# Patient Record
Sex: MALE | Race: WHITE | NOT HISPANIC OR LATINO | Employment: UNEMPLOYED | URBAN - METROPOLITAN AREA
[De-identification: names, ages, dates, MRNs, and addresses within clinical notes are randomized per-mention and may not be internally consistent; named-entity substitution may affect disease eponyms.]

---

## 2024-05-23 ENCOUNTER — OFFICE VISIT (OUTPATIENT)
Dept: FAMILY MEDICINE CLINIC | Facility: CLINIC | Age: 34
End: 2024-05-23
Payer: COMMERCIAL

## 2024-05-23 VITALS
TEMPERATURE: 97.3 F | BODY MASS INDEX: 32.07 KG/M2 | SYSTOLIC BLOOD PRESSURE: 140 MMHG | HEIGHT: 73 IN | DIASTOLIC BLOOD PRESSURE: 80 MMHG | RESPIRATION RATE: 20 BRPM | WEIGHT: 242 LBS | HEART RATE: 68 BPM

## 2024-05-23 DIAGNOSIS — K21.9 GASTROESOPHAGEAL REFLUX DISEASE WITHOUT ESOPHAGITIS: ICD-10-CM

## 2024-05-23 DIAGNOSIS — Z00.00 ANNUAL PHYSICAL EXAM: Primary | ICD-10-CM

## 2024-05-23 DIAGNOSIS — Z13.220 SCREENING CHOLESTEROL LEVEL: ICD-10-CM

## 2024-05-23 PROBLEM — L40.50 PSORIATIC ARTHRITIS: Status: ACTIVE | Noted: 2024-05-23

## 2024-05-23 PROBLEM — M05.742 RHEUMATOID ARTHRITIS INVOLVING BOTH HANDS WITH POSITIVE RHEUMATOID FACTOR: Status: ACTIVE | Noted: 2024-05-23

## 2024-05-23 PROBLEM — K22.70 BARRETT'S ESOPHAGUS WITHOUT DYSPLASIA: Status: ACTIVE | Noted: 2024-05-23

## 2024-05-23 PROBLEM — M05.741 RHEUMATOID ARTHRITIS INVOLVING BOTH HANDS WITH POSITIVE RHEUMATOID FACTOR: Status: ACTIVE | Noted: 2024-05-23

## 2024-05-23 PROCEDURE — 2014F MENTAL STATUS ASSESS: CPT | Performed by: FAMILY MEDICINE

## 2024-05-23 PROCEDURE — 99385 PREV VISIT NEW AGE 18-39: CPT | Performed by: FAMILY MEDICINE

## 2024-05-23 RX ORDER — OMEPRAZOLE 20 MG/1
20 CAPSULE, DELAYED RELEASE ORAL DAILY
Qty: 90 CAPSULE | Refills: 3 | Status: SHIPPED | OUTPATIENT
Start: 2024-05-23

## 2024-05-23 RX ORDER — OMEPRAZOLE 20 MG/1
1 CAPSULE, DELAYED RELEASE ORAL DAILY
COMMUNITY
Start: 2024-04-15 | End: 2024-05-23 | Stop reason: SDUPTHER

## 2024-05-23 NOTE — PROGRESS NOTES
"Chief Complaint   Patient presents with    NP / establish PCP     Heartburn     Acid reflux / indigestion (seen at [facility] approx 4 wks ago)        Subjective      Joel Angelo is a 34 y.o. who presents for Annual Physical.    Sleep.  6 to 8 hours per night.    Diet.  Patient eats whole foods with minimal junk food.  He has lactose intolerance.    Physical activity.  Patient exercises combination of aerobic and resistance training 6 days/week.    Medical history is significant for Baires's esophagus diagnosed in 2020 with biopsies negative for dysplasia.  Patient is also been diagnosed with rheumatoid arthritis and psoriatic arthritis.  He did not tolerate methotrexate.    The following portions of the patient's history were reviewed and updated as appropriate: allergies, current medications, past family history, past medical history, past social history, past surgical history, and problem list.    Review of Systems   Gastrointestinal:  Positive for GERD.   Musculoskeletal:  Positive for arthralgias.   All other systems reviewed and are negative.      Objective   Vital Signs:  /80   Pulse 68   Temp 97.3 °F (36.3 °C)   Resp 20   Ht 185.4 cm (73\")   Wt 110 kg (242 lb)   BMI 31.93 kg/m²     BMI is >= 30 and <35. (Class 1 Obesity). The following options were offered after discussion;: exercise counseling/recommendations and nutrition counseling/recommendations        Physical Exam  Constitutional:       General: He is not in acute distress.     Appearance: Normal appearance. He is not ill-appearing.   HENT:      Head: Normocephalic and atraumatic.      Right Ear: Tympanic membrane and ear canal normal.      Left Ear: Tympanic membrane and ear canal normal.      Nose: Nose normal.      Mouth/Throat:      Mouth: Mucous membranes are moist.      Pharynx: Oropharynx is clear. No posterior oropharyngeal erythema.   Eyes:      Extraocular Movements: Extraocular movements intact.      Conjunctiva/sclera: Conjunctivae " normal.      Pupils: Pupils are equal, round, and reactive to light.   Cardiovascular:      Rate and Rhythm: Normal rate and regular rhythm.      Pulses: Normal pulses.      Heart sounds: Normal heart sounds.   Pulmonary:      Effort: Pulmonary effort is normal. No respiratory distress.      Breath sounds: Normal breath sounds.   Abdominal:      General: Abdomen is flat. Bowel sounds are normal.      Palpations: Abdomen is soft.      Tenderness: There is no abdominal tenderness.   Musculoskeletal:         General: Normal range of motion.      Cervical back: Normal range of motion and neck supple.   Lymphadenopathy:      Cervical: No cervical adenopathy.   Skin:     General: Skin is warm and dry.      Capillary Refill: Capillary refill takes less than 2 seconds.   Neurological:      General: No focal deficit present.      Mental Status: He is alert and oriented to person, place, and time. Mental status is at baseline.      Cranial Nerves: No cranial nerve deficit.      Sensory: No sensory deficit.      Motor: No weakness.   Psychiatric:         Mood and Affect: Mood normal.         Behavior: Behavior normal.         Thought Content: Thought content normal.         Judgment: Judgment normal.          Result Review                     Assessment and Plan  Diagnoses and all orders for this visit:    1. Annual physical exam (Primary)    2. Gastroesophageal reflux disease without esophagitis  -     omeprazole (priLOSEC) 20 MG capsule; Take 1 capsule by mouth Daily.  Dispense: 90 capsule; Refill: 3    3. Screening cholesterol level  -     Lipid Panel; Future    Plan  1.  Patient should continue his active lifestyle.  While his BMI is above goal patient is a well muscled individual.  We reviewed physical activity and dietary guidelines  2.  Patient will return next week for screening labs  3.  Tdap is up-to-date and received prior to transferring care to this office  4.  Omeprazole was refilled for 1 year and patient will  need EGD in 2025  5.  He will continue over-the-counter NSAIDs for any joint pains which occur infrequently        Follow Up  Return in about 1 year (around 5/23/2025) for Annual.  Patient was given instructions and counseling regarding his condition or for health maintenance advice. Please see specific information pulled into the AVS if appropriate.

## 2024-05-30 ENCOUNTER — PATIENT ROUNDING (BHMG ONLY) (OUTPATIENT)
Dept: FAMILY MEDICINE CLINIC | Facility: CLINIC | Age: 34
End: 2024-05-30
Payer: COMMERCIAL

## 2024-06-14 ENCOUNTER — OFFICE VISIT (OUTPATIENT)
Dept: FAMILY MEDICINE CLINIC | Facility: CLINIC | Age: 34
End: 2024-06-14
Payer: COMMERCIAL

## 2024-06-14 VITALS
SYSTOLIC BLOOD PRESSURE: 116 MMHG | TEMPERATURE: 98.4 F | OXYGEN SATURATION: 95 % | HEIGHT: 73 IN | WEIGHT: 241 LBS | HEART RATE: 66 BPM | DIASTOLIC BLOOD PRESSURE: 72 MMHG | BODY MASS INDEX: 31.94 KG/M2

## 2024-06-14 DIAGNOSIS — R31.9 HEMATURIA, UNSPECIFIED TYPE: Primary | ICD-10-CM

## 2024-06-14 LAB
BILIRUB BLD-MCNC: NEGATIVE MG/DL
CLARITY, POC: CLEAR
COLOR UR: YELLOW
EXPIRATION DATE: NORMAL
GLUCOSE UR STRIP-MCNC: NEGATIVE MG/DL
KETONES UR QL: NEGATIVE
LEUKOCYTE EST, POC: NEGATIVE
Lab: NORMAL
NITRITE UR-MCNC: NEGATIVE MG/ML
PH UR: 7.5 [PH] (ref 5–8)
PROT UR STRIP-MCNC: NEGATIVE MG/DL
RBC # UR STRIP: NEGATIVE /UL
SP GR UR: 1.01 (ref 1–1.03)
UROBILINOGEN UR QL: NORMAL

## 2024-06-14 NOTE — PROGRESS NOTES
Date: 2024   Patient Name: Joel Angelo  : 1990   MRN: [id number]     Chief Complaint:    Chief Complaint   Patient presents with    Blood in Urine     Noticed Tuesday, but not noticed since       History of Present Illness: Joel Angelo is a 34 y.o. male who is here today for On Tuesday, patient not painless hematuria. He also noticed blood in his underwear. He denies any suprapubic pain, flank pain, dysuria, urinary frequency. He has not had any hematuria since Tuesday. He has not had any new sex partners.     Blood in Urine             Review of Systems:   Review of Systems   Genitourinary:  Positive for hematuria.       Past Medical History:   Past Medical History:   Diagnosis Date    Baires's esophagus        Past Surgical History: History reviewed. No pertinent surgical history.    Family History:   Family History   Problem Relation Age of Onset    Diabetes type II Father     Rheum arthritis Father     Diabetes type II Sister        Social History:   Social History     Socioeconomic History    Marital status:    Tobacco Use    Smoking status: Former     Current packs/day: 0.00     Average packs/day: 0.3 packs/day for 2.9 years (0.7 ttl pk-yrs)     Types: Cigarettes     Start date: 2010     Quit date: 2012     Years since quittin.5    Smokeless tobacco: Former   Vaping Use    Vaping status: Never Used   Substance and Sexual Activity    Alcohol use: Not Currently     Alcohol/week: 1.0 standard drink of alcohol     Types: 1 Drinks containing 0.5 oz of alcohol per week    Drug use: Never    Sexual activity: Yes     Partners: Female     Birth control/protection: Hysterectomy       Medications:     Current Outpatient Medications:     omeprazole (priLOSEC) 20 MG capsule, Take 1 capsule by mouth Daily., Disp: 90 capsule, Rfl: 3    Allergies:   Allergies   Allergen Reactions    Methotrexate Derivatives Other (See Comments)     Patient felt unwell         Physical Exam:  Vital  "Signs:   Vitals:    06/14/24 1243   BP: 116/72   Pulse: 66   Temp: 98.4 °F (36.9 °C)   SpO2: 95%   Weight: 109 kg (241 lb)   Height: 185.4 cm (73\")     Body mass index is 31.8 kg/m².     Physical Exam  Vitals and nursing note reviewed.   Constitutional:       Appearance: Normal appearance.   HENT:      Head: Normocephalic and atraumatic.   Cardiovascular:      Rate and Rhythm: Normal rate and regular rhythm.   Pulmonary:      Effort: Pulmonary effort is normal. No respiratory distress.      Breath sounds: Normal breath sounds.   Abdominal:      General: Bowel sounds are normal.      Palpations: Abdomen is soft.      Tenderness: There is no abdominal tenderness. There is no right CVA tenderness or left CVA tenderness.   Musculoskeletal:         General: Normal range of motion.   Skin:     General: Skin is warm.   Neurological:      General: No focal deficit present.      Mental Status: He is alert and oriented to person, place, and time.   Psychiatric:         Mood and Affect: Mood normal.           Assessment/Plan:   Diagnoses and all orders for this visit:    1. Hematuria, unspecified type (Primary)  -     POCT urinalysis dipstick, automated       UA negative in clinic  Educated patient potentially passed a kidney stone  Symptoms have since resolved  If symptoms present again, encouraged to get into clinic sooner  If symptoms are severe 10/10, blood in urine go to the ER     Follow Up:   Return if symptoms worsen or fail to improve.    Stacey Cadena. SHANDRA   [facility]   "

## 2024-09-08 ENCOUNTER — ANESTHESIA EVENT (OUTPATIENT)
Dept: PERIOP | Facility: HOSPITAL | Age: 34
End: 2024-09-08
Payer: COMMERCIAL

## 2024-09-08 ENCOUNTER — ANESTHESIA (OUTPATIENT)
Dept: PERIOP | Facility: HOSPITAL | Age: 34
End: 2024-09-08
Payer: COMMERCIAL

## 2024-09-08 ENCOUNTER — HOSPITAL ENCOUNTER (OUTPATIENT)
Facility: HOSPITAL | Age: 34
Discharge: HOME | End: 2024-09-09
Attending: EMERGENCY MEDICINE | Admitting: SURGERY
Payer: COMMERCIAL

## 2024-09-08 ENCOUNTER — APPOINTMENT (OUTPATIENT)
Facility: HOSPITAL | Age: 34
End: 2024-09-08
Payer: COMMERCIAL

## 2024-09-08 DIAGNOSIS — K37 APPENDICITIS: ICD-10-CM

## 2024-09-08 DIAGNOSIS — K35.30 ACUTE APPENDICITIS WITH LOCALIZED PERITONITIS, WITHOUT PERFORATION, ABSCESS, OR GANGRENE: ICD-10-CM

## 2024-09-08 DIAGNOSIS — K35.30 ACUTE APPENDICITIS WITH LOCALIZED PERITONITIS, WITHOUT PERFORATION OR ABSCESS, UNSPECIFIED WHETHER GANGRENE PRESENT: Primary | ICD-10-CM

## 2024-09-08 LAB
ALBUMIN SERPL-MCNC: 4.8 G/DL (ref 3.5–5.2)
ALBUMIN/GLOB SERPL: 1.8 G/DL
ALP SERPL-CCNC: 142 U/L (ref 39–117)
ALT SERPL W P-5'-P-CCNC: 29 U/L (ref 1–41)
ANION GAP SERPL CALCULATED.3IONS-SCNC: 11.5 MMOL/L (ref 5–15)
AST SERPL-CCNC: 25 U/L (ref 1–40)
BASOPHILS # BLD AUTO: 0.03 10*3/MM3 (ref 0–0.2)
BASOPHILS NFR BLD AUTO: 0.5 % (ref 0–1.5)
BILIRUB SERPL-MCNC: 0.5 MG/DL (ref 0–1.2)
BILIRUB UR QL STRIP: NEGATIVE
BUN SERPL-MCNC: 14 MG/DL (ref 6–20)
BUN/CREAT SERPL: 15.1 (ref 7–25)
CALCIUM SPEC-SCNC: 9.9 MG/DL (ref 8.6–10.5)
CHLORIDE SERPL-SCNC: 103 MMOL/L (ref 98–107)
CLARITY UR: CLEAR
CO2 SERPL-SCNC: 26.5 MMOL/L (ref 22–29)
COLOR UR: YELLOW
CREAT SERPL-MCNC: 0.93 MG/DL (ref 0.76–1.27)
DEPRECATED RDW RBC AUTO: 41.5 FL (ref 37–54)
EGFRCR SERPLBLD CKD-EPI 2021: 110.5 ML/MIN/1.73
EOSINOPHIL # BLD AUTO: 0.13 10*3/MM3 (ref 0–0.4)
EOSINOPHIL NFR BLD AUTO: 2.1 % (ref 0.3–6.2)
ERYTHROCYTE [DISTWIDTH] IN BLOOD BY AUTOMATED COUNT: 12.8 % (ref 12.3–15.4)
GLOBULIN UR ELPH-MCNC: 2.7 GM/DL
GLUCOSE SERPL-MCNC: 100 MG/DL (ref 65–99)
GLUCOSE UR STRIP-MCNC: NEGATIVE MG/DL
HCT VFR BLD AUTO: 46.9 % (ref 37.5–51)
HGB BLD-MCNC: 15.9 G/DL (ref 13–17.7)
HGB UR QL STRIP.AUTO: NEGATIVE
HOLD SPECIMEN: NORMAL
IMM GRANULOCYTES # BLD AUTO: 0 10*3/MM3 (ref 0–0.05)
IMM GRANULOCYTES NFR BLD AUTO: 0 % (ref 0–0.5)
KETONES UR QL STRIP: NEGATIVE
LEUKOCYTE ESTERASE UR QL STRIP.AUTO: NEGATIVE
LIPASE SERPL-CCNC: 29 U/L (ref 13–60)
LYMPHOCYTES # BLD AUTO: 1.83 10*3/MM3 (ref 0.7–3.1)
LYMPHOCYTES NFR BLD AUTO: 30.2 % (ref 19.6–45.3)
MCH RBC QN AUTO: 29.8 PG (ref 26.6–33)
MCHC RBC AUTO-ENTMCNC: 33.9 G/DL (ref 31.5–35.7)
MCV RBC AUTO: 87.8 FL (ref 79–97)
MONOCYTES # BLD AUTO: 0.61 10*3/MM3 (ref 0.1–0.9)
MONOCYTES NFR BLD AUTO: 10.1 % (ref 5–12)
NEUTROPHILS NFR BLD AUTO: 3.46 10*3/MM3 (ref 1.7–7)
NEUTROPHILS NFR BLD AUTO: 57.1 % (ref 42.7–76)
NITRITE UR QL STRIP: NEGATIVE
PH UR STRIP.AUTO: 7 [PH] (ref 5–8)
PLATELET # BLD AUTO: 243 10*3/MM3 (ref 140–450)
PMV BLD AUTO: 10 FL (ref 6–12)
POTASSIUM SERPL-SCNC: 4.5 MMOL/L (ref 3.5–5.2)
PROT SERPL-MCNC: 7.5 G/DL (ref 6–8.5)
PROT UR QL STRIP: NEGATIVE
RBC # BLD AUTO: 5.34 10*6/MM3 (ref 4.14–5.8)
SODIUM SERPL-SCNC: 141 MMOL/L (ref 136–145)
SP GR UR STRIP: <=1.005 (ref 1–1.03)
UROBILINOGEN UR QL STRIP: NORMAL
WBC NRBC COR # BLD AUTO: 6.06 10*3/MM3 (ref 3.4–10.8)
WHOLE BLOOD HOLD COAG: NORMAL
WHOLE BLOOD HOLD SPECIMEN: NORMAL

## 2024-09-08 PROCEDURE — 25810000003 SODIUM CHLORIDE 0.9 % SOLUTION: Performed by: PHYSICIAN ASSISTANT

## 2024-09-08 PROCEDURE — 25510000001 IOPAMIDOL 61 % SOLUTION: Performed by: EMERGENCY MEDICINE

## 2024-09-08 PROCEDURE — 25010000002 BUPIVACAINE 0.5 % SOLUTION: Performed by: SURGERY

## 2024-09-08 PROCEDURE — 96365 THER/PROPH/DIAG IV INF INIT: CPT

## 2024-09-08 PROCEDURE — 25010000002 PIPERACILLIN SOD-TAZOBACTAM PER 1 G: Performed by: PHYSICIAN ASSISTANT

## 2024-09-08 PROCEDURE — 25010000002 DROPERIDOL PER 5 MG

## 2024-09-08 PROCEDURE — 25010000002 KETOROLAC TROMETHAMINE PER 15 MG: Performed by: STUDENT IN AN ORGANIZED HEALTH CARE EDUCATION/TRAINING PROGRAM

## 2024-09-08 PROCEDURE — 25010000002 ONDANSETRON PER 1 MG: Performed by: STUDENT IN AN ORGANIZED HEALTH CARE EDUCATION/TRAINING PROGRAM

## 2024-09-08 PROCEDURE — 99285 EMERGENCY DEPT VISIT HI MDM: CPT

## 2024-09-08 PROCEDURE — 81003 URINALYSIS AUTO W/O SCOPE: CPT | Performed by: PHYSICIAN ASSISTANT

## 2024-09-08 PROCEDURE — G0378 HOSPITAL OBSERVATION PER HR: HCPCS

## 2024-09-08 PROCEDURE — 25010000002 DEXAMETHASONE PER 1 MG: Performed by: STUDENT IN AN ORGANIZED HEALTH CARE EDUCATION/TRAINING PROGRAM

## 2024-09-08 PROCEDURE — 25810000003 SODIUM CHLORIDE PER 500 ML: Performed by: SURGERY

## 2024-09-08 PROCEDURE — 25810000003 LACTATED RINGERS PER 1000 ML: Performed by: SURGERY

## 2024-09-08 PROCEDURE — 25010000002 MORPHINE PER 10 MG: Performed by: SURGERY

## 2024-09-08 PROCEDURE — 25010000002 PROPOFOL 10 MG/ML EMULSION: Performed by: STUDENT IN AN ORGANIZED HEALTH CARE EDUCATION/TRAINING PROGRAM

## 2024-09-08 PROCEDURE — 85025 COMPLETE CBC W/AUTO DIFF WBC: CPT | Performed by: PHYSICIAN ASSISTANT

## 2024-09-08 PROCEDURE — 80053 COMPREHEN METABOLIC PANEL: CPT | Performed by: PHYSICIAN ASSISTANT

## 2024-09-08 PROCEDURE — 25010000002 MIDAZOLAM PER 1 MG: Performed by: STUDENT IN AN ORGANIZED HEALTH CARE EDUCATION/TRAINING PROGRAM

## 2024-09-08 PROCEDURE — 88304 TISSUE EXAM BY PATHOLOGIST: CPT | Performed by: SURGERY

## 2024-09-08 PROCEDURE — 25010000002 FENTANYL CITRATE (PF) 100 MCG/2ML SOLUTION: Performed by: STUDENT IN AN ORGANIZED HEALTH CARE EDUCATION/TRAINING PROGRAM

## 2024-09-08 PROCEDURE — 25010000002 FENTANYL CITRATE (PF) 50 MCG/ML SOLUTION

## 2024-09-08 PROCEDURE — 25010000002 HYDROMORPHONE 1 MG/ML SOLUTION

## 2024-09-08 PROCEDURE — 25010000002 GLYCOPYRROLATE 1 MG/5ML SOLUTION: Performed by: STUDENT IN AN ORGANIZED HEALTH CARE EDUCATION/TRAINING PROGRAM

## 2024-09-08 PROCEDURE — 83690 ASSAY OF LIPASE: CPT | Performed by: PHYSICIAN ASSISTANT

## 2024-09-08 PROCEDURE — 74177 CT ABD & PELVIS W/CONTRAST: CPT

## 2024-09-08 PROCEDURE — 96375 TX/PRO/DX INJ NEW DRUG ADDON: CPT

## 2024-09-08 PROCEDURE — 25010000002 SUGAMMADEX 200 MG/2ML SOLUTION: Performed by: STUDENT IN AN ORGANIZED HEALTH CARE EDUCATION/TRAINING PROGRAM

## 2024-09-08 PROCEDURE — 25810000003 LACTATED RINGERS PER 1000 ML: Performed by: STUDENT IN AN ORGANIZED HEALTH CARE EDUCATION/TRAINING PROGRAM

## 2024-09-08 PROCEDURE — 25010000002 PIPERACILLIN SOD-TAZOBACTAM PER 1 G: Performed by: SURGERY

## 2024-09-08 DEVICE — THE ECHELON, ECHELON ENDOPATH™ AND ECHELON FLEX™ FAMILIES OF ENDOSCOPIC LINEAR CUTTERS AND RELOADS ARE STERILE, SINGLE PATIENT USE INSTRUMENTS THAT SIMULTANEOUSLY CUT AND STAPLE TISSUE. THERE ARE SIX STAGGERED ROWS OF STAPLES, THREE ON EITHER SIDE OF THE CUT LINE. THE 45 MM INSTRUMENTS HAVE A STAPLE LINE THATIS APPROXIMATELY 45 MM LONG AND A CUT LINE THAT IS APPROXIMATELY 42 MM LONG. THE SHAFT CAN ROTATE FREELY IN BOTH DIRECTIONS AND AN ARTICULATION MECHANISM ON ARTICULATING INSTRUMENTS ENABLES BENDING THE DISTAL PORTIONOF THE SHAFT TO FACILITATE LATERAL ACCESS OF THE OPERATIVE SITE.THE INSTRUMENTS ARE SHIPPED WITHOUT A RELOAD AND MUST BE LOADED PRIOR TO USE. A STAPLE RETAINING CAP ON THE RELOAD PROTECTS THE STAPLE LEG POINTS DURING SHIPPING AND TRANSPORTATION. THE INSTRUMENTS’ LOCK-OUT FEATURE IS DESIGNED TO PREVENT A USED RELOAD FROM BEING REFIRED.
Type: IMPLANTABLE DEVICE | Site: ABDOMEN | Status: FUNCTIONAL
Brand: ECHELON ENDOPATH

## 2024-09-08 RX ORDER — GLYCOPYRROLATE 0.2 MG/ML
INJECTION INTRAMUSCULAR; INTRAVENOUS AS NEEDED
Status: DISCONTINUED | OUTPATIENT
Start: 2024-09-08 | End: 2024-09-08 | Stop reason: SURG

## 2024-09-08 RX ORDER — MULTIPLE VITAMINS W/ MINERALS TAB 9MG-400MCG
1 TAB ORAL DAILY
Status: DISCONTINUED | OUTPATIENT
Start: 2024-09-08 | End: 2024-09-09 | Stop reason: HOSPADM

## 2024-09-08 RX ORDER — SODIUM CHLORIDE, SODIUM LACTATE, POTASSIUM CHLORIDE, CALCIUM CHLORIDE 600; 310; 30; 20 MG/100ML; MG/100ML; MG/100ML; MG/100ML
INJECTION, SOLUTION INTRAVENOUS CONTINUOUS PRN
Status: DISCONTINUED | OUTPATIENT
Start: 2024-09-08 | End: 2024-09-08 | Stop reason: SURG

## 2024-09-08 RX ORDER — FAMOTIDINE 20 MG/1
20 TABLET, FILM COATED ORAL ONCE
Status: DISCONTINUED | OUTPATIENT
Start: 2024-09-08 | End: 2024-09-08 | Stop reason: HOSPADM

## 2024-09-08 RX ORDER — BUPIVACAINE HYDROCHLORIDE 5 MG/ML
INJECTION, SOLUTION PERINEURAL AS NEEDED
Status: DISCONTINUED | OUTPATIENT
Start: 2024-09-08 | End: 2024-09-08 | Stop reason: HOSPADM

## 2024-09-08 RX ORDER — MELOXICAM 7.5 MG/1
7.5 TABLET ORAL DAILY
Status: DISCONTINUED | OUTPATIENT
Start: 2024-09-08 | End: 2024-09-09 | Stop reason: HOSPADM

## 2024-09-08 RX ORDER — PANTOPRAZOLE SODIUM 40 MG/1
40 TABLET, DELAYED RELEASE ORAL
Status: DISCONTINUED | OUTPATIENT
Start: 2024-09-09 | End: 2024-09-09 | Stop reason: HOSPADM

## 2024-09-08 RX ORDER — CARISOPRODOL 350 MG/1
350 TABLET ORAL EVERY 12 HOURS PRN
Status: DISCONTINUED | OUTPATIENT
Start: 2024-09-08 | End: 2024-09-09 | Stop reason: HOSPADM

## 2024-09-08 RX ORDER — HYDROCODONE BITARTRATE AND ACETAMINOPHEN 5; 325 MG/1; MG/1
1 TABLET ORAL EVERY 6 HOURS PRN
Status: DISCONTINUED | OUTPATIENT
Start: 2024-09-08 | End: 2024-09-09 | Stop reason: HOSPADM

## 2024-09-08 RX ORDER — DEXMEDETOMIDINE HYDROCHLORIDE 4 UG/ML
INJECTION, SOLUTION INTRAVENOUS AS NEEDED
Status: DISCONTINUED | OUTPATIENT
Start: 2024-09-08 | End: 2024-09-08 | Stop reason: SURG

## 2024-09-08 RX ORDER — SODIUM CHLORIDE 9 MG/ML
40 INJECTION, SOLUTION INTRAVENOUS AS NEEDED
Status: DISCONTINUED | OUTPATIENT
Start: 2024-09-08 | End: 2024-09-08 | Stop reason: HOSPADM

## 2024-09-08 RX ORDER — ACETAMINOPHEN 500 MG
500 TABLET ORAL EVERY 6 HOURS
Status: DISCONTINUED | OUTPATIENT
Start: 2024-09-08 | End: 2024-09-09 | Stop reason: HOSPADM

## 2024-09-08 RX ORDER — ONDANSETRON 2 MG/ML
INJECTION INTRAMUSCULAR; INTRAVENOUS AS NEEDED
Status: DISCONTINUED | OUTPATIENT
Start: 2024-09-08 | End: 2024-09-08 | Stop reason: SURG

## 2024-09-08 RX ORDER — MIDAZOLAM HYDROCHLORIDE 1 MG/ML
1 INJECTION INTRAMUSCULAR; INTRAVENOUS
Status: DISCONTINUED | OUTPATIENT
Start: 2024-09-08 | End: 2024-09-08 | Stop reason: HOSPADM

## 2024-09-08 RX ORDER — LIDOCAINE HYDROCHLORIDE 10 MG/ML
INJECTION, SOLUTION EPIDURAL; INFILTRATION; INTRACAUDAL; PERINEURAL AS NEEDED
Status: DISCONTINUED | OUTPATIENT
Start: 2024-09-08 | End: 2024-09-08 | Stop reason: SURG

## 2024-09-08 RX ORDER — FAMOTIDINE 10 MG/ML
20 INJECTION, SOLUTION INTRAVENOUS ONCE
Status: DISCONTINUED | OUTPATIENT
Start: 2024-09-08 | End: 2024-09-08 | Stop reason: HOSPADM

## 2024-09-08 RX ORDER — ONDANSETRON 4 MG/1
4 TABLET, ORALLY DISINTEGRATING ORAL EVERY 6 HOURS PRN
Status: DISCONTINUED | OUTPATIENT
Start: 2024-09-08 | End: 2024-09-09 | Stop reason: HOSPADM

## 2024-09-08 RX ORDER — FENTANYL CITRATE 50 UG/ML
50 INJECTION, SOLUTION INTRAMUSCULAR; INTRAVENOUS
Status: DISCONTINUED | OUTPATIENT
Start: 2024-09-08 | End: 2024-09-08

## 2024-09-08 RX ORDER — NALOXONE HCL 0.4 MG/ML
0.4 VIAL (ML) INJECTION
Status: DISCONTINUED | OUTPATIENT
Start: 2024-09-08 | End: 2024-09-09 | Stop reason: HOSPADM

## 2024-09-08 RX ORDER — EPHEDRINE SULFATE 50 MG/ML
INJECTION INTRAVENOUS AS NEEDED
Status: DISCONTINUED | OUTPATIENT
Start: 2024-09-08 | End: 2024-09-08 | Stop reason: SURG

## 2024-09-08 RX ORDER — PROPOFOL 10 MG/ML
VIAL (ML) INTRAVENOUS AS NEEDED
Status: DISCONTINUED | OUTPATIENT
Start: 2024-09-08 | End: 2024-09-08 | Stop reason: SURG

## 2024-09-08 RX ORDER — IOPAMIDOL 612 MG/ML
100 INJECTION, SOLUTION INTRAVASCULAR
Status: COMPLETED | OUTPATIENT
Start: 2024-09-08 | End: 2024-09-08

## 2024-09-08 RX ORDER — DROPERIDOL 2.5 MG/ML
0.62 INJECTION, SOLUTION INTRAMUSCULAR; INTRAVENOUS ONCE AS NEEDED
Status: COMPLETED | OUTPATIENT
Start: 2024-09-08 | End: 2024-09-08

## 2024-09-08 RX ORDER — SODIUM CHLORIDE 0.9 % (FLUSH) 0.9 %
10 SYRINGE (ML) INJECTION EVERY 12 HOURS SCHEDULED
Status: DISCONTINUED | OUTPATIENT
Start: 2024-09-08 | End: 2024-09-08 | Stop reason: HOSPADM

## 2024-09-08 RX ORDER — SODIUM CHLORIDE 0.9 % (FLUSH) 0.9 %
10 SYRINGE (ML) INJECTION AS NEEDED
Status: DISCONTINUED | OUTPATIENT
Start: 2024-09-08 | End: 2024-09-08 | Stop reason: HOSPADM

## 2024-09-08 RX ORDER — LIDOCAINE HYDROCHLORIDE 10 MG/ML
0.5 INJECTION, SOLUTION EPIDURAL; INFILTRATION; INTRACAUDAL; PERINEURAL ONCE AS NEEDED
Status: DISCONTINUED | OUTPATIENT
Start: 2024-09-08 | End: 2024-09-08 | Stop reason: HOSPADM

## 2024-09-08 RX ORDER — DEXAMETHASONE SODIUM PHOSPHATE 4 MG/ML
INJECTION, SOLUTION INTRA-ARTICULAR; INTRALESIONAL; INTRAMUSCULAR; INTRAVENOUS; SOFT TISSUE AS NEEDED
Status: DISCONTINUED | OUTPATIENT
Start: 2024-09-08 | End: 2024-09-08 | Stop reason: SURG

## 2024-09-08 RX ORDER — SODIUM CHLORIDE, SODIUM LACTATE, POTASSIUM CHLORIDE, CALCIUM CHLORIDE 600; 310; 30; 20 MG/100ML; MG/100ML; MG/100ML; MG/100ML
75 INJECTION, SOLUTION INTRAVENOUS CONTINUOUS
Status: DISCONTINUED | OUTPATIENT
Start: 2024-09-08 | End: 2024-09-09 | Stop reason: HOSPADM

## 2024-09-08 RX ORDER — MIDAZOLAM HYDROCHLORIDE 1 MG/ML
INJECTION INTRAMUSCULAR; INTRAVENOUS AS NEEDED
Status: DISCONTINUED | OUTPATIENT
Start: 2024-09-08 | End: 2024-09-08 | Stop reason: SURG

## 2024-09-08 RX ORDER — MORPHINE SULFATE 2 MG/ML
2 INJECTION, SOLUTION INTRAMUSCULAR; INTRAVENOUS
Status: DISCONTINUED | OUTPATIENT
Start: 2024-09-08 | End: 2024-09-09 | Stop reason: HOSPADM

## 2024-09-08 RX ORDER — FENTANYL CITRATE 50 UG/ML
INJECTION, SOLUTION INTRAMUSCULAR; INTRAVENOUS
Status: COMPLETED
Start: 2024-09-08 | End: 2024-09-08

## 2024-09-08 RX ORDER — SODIUM CHLORIDE 9 MG/ML
INJECTION, SOLUTION INTRAVENOUS AS NEEDED
Status: DISCONTINUED | OUTPATIENT
Start: 2024-09-08 | End: 2024-09-08 | Stop reason: HOSPADM

## 2024-09-08 RX ORDER — DROPERIDOL 2.5 MG/ML
INJECTION, SOLUTION INTRAMUSCULAR; INTRAVENOUS
Status: COMPLETED
Start: 2024-09-08 | End: 2024-09-08

## 2024-09-08 RX ORDER — ONDANSETRON 2 MG/ML
4 INJECTION INTRAMUSCULAR; INTRAVENOUS EVERY 6 HOURS PRN
Status: DISCONTINUED | OUTPATIENT
Start: 2024-09-08 | End: 2024-09-09 | Stop reason: HOSPADM

## 2024-09-08 RX ORDER — HYDROMORPHONE HYDROCHLORIDE 1 MG/ML
0.5 INJECTION, SOLUTION INTRAMUSCULAR; INTRAVENOUS; SUBCUTANEOUS
Status: DISCONTINUED | OUTPATIENT
Start: 2024-09-08 | End: 2024-09-08

## 2024-09-08 RX ORDER — SODIUM CHLORIDE, SODIUM LACTATE, POTASSIUM CHLORIDE, CALCIUM CHLORIDE 600; 310; 30; 20 MG/100ML; MG/100ML; MG/100ML; MG/100ML
150 INJECTION, SOLUTION INTRAVENOUS CONTINUOUS
Status: DISCONTINUED | OUTPATIENT
Start: 2024-09-08 | End: 2024-09-08

## 2024-09-08 RX ORDER — SODIUM CHLORIDE, SODIUM LACTATE, POTASSIUM CHLORIDE, CALCIUM CHLORIDE 600; 310; 30; 20 MG/100ML; MG/100ML; MG/100ML; MG/100ML
9 INJECTION, SOLUTION INTRAVENOUS CONTINUOUS
Status: DISCONTINUED | OUTPATIENT
Start: 2024-09-08 | End: 2024-09-08

## 2024-09-08 RX ORDER — FENTANYL CITRATE 50 UG/ML
INJECTION, SOLUTION INTRAMUSCULAR; INTRAVENOUS AS NEEDED
Status: DISCONTINUED | OUTPATIENT
Start: 2024-09-08 | End: 2024-09-08 | Stop reason: SURG

## 2024-09-08 RX ORDER — HEPARIN SODIUM 5000 [USP'U]/ML
5000 INJECTION, SOLUTION INTRAVENOUS; SUBCUTANEOUS EVERY 8 HOURS SCHEDULED
Status: DISCONTINUED | OUTPATIENT
Start: 2024-09-09 | End: 2024-09-09 | Stop reason: HOSPADM

## 2024-09-08 RX ORDER — SODIUM CHLORIDE 0.9 % (FLUSH) 0.9 %
10 SYRINGE (ML) INJECTION AS NEEDED
Status: DISCONTINUED | OUTPATIENT
Start: 2024-09-08 | End: 2024-09-09 | Stop reason: HOSPADM

## 2024-09-08 RX ORDER — KETOROLAC TROMETHAMINE 30 MG/ML
INJECTION, SOLUTION INTRAMUSCULAR; INTRAVENOUS AS NEEDED
Status: DISCONTINUED | OUTPATIENT
Start: 2024-09-08 | End: 2024-09-08 | Stop reason: SURG

## 2024-09-08 RX ORDER — ROCURONIUM BROMIDE 10 MG/ML
INJECTION, SOLUTION INTRAVENOUS AS NEEDED
Status: DISCONTINUED | OUTPATIENT
Start: 2024-09-08 | End: 2024-09-08 | Stop reason: SURG

## 2024-09-08 RX ORDER — FAMOTIDINE 10 MG/ML
20 INJECTION, SOLUTION INTRAVENOUS 2 TIMES DAILY
Status: DISCONTINUED | OUTPATIENT
Start: 2024-09-08 | End: 2024-09-08

## 2024-09-08 RX ADMIN — HYDROMORPHONE HYDROCHLORIDE 0.5 MG: 1 INJECTION, SOLUTION INTRAMUSCULAR; INTRAVENOUS; SUBCUTANEOUS at 16:38

## 2024-09-08 RX ADMIN — MORPHINE SULFATE 2 MG: 2 INJECTION, SOLUTION INTRAMUSCULAR; INTRAVENOUS at 23:32

## 2024-09-08 RX ADMIN — Medication 1 TABLET: at 17:55

## 2024-09-08 RX ADMIN — ROCURONIUM BROMIDE 50 MG: 10 INJECTION INTRAVENOUS at 14:44

## 2024-09-08 RX ADMIN — DEXMEDETOMIDINE HYDROCHLORIDE IN 0.9% SODIUM CHLORIDE 12 MCG: 4 INJECTION INTRAVENOUS at 14:56

## 2024-09-08 RX ADMIN — EPHEDRINE SULFATE 10 MG: 50 INJECTION INTRAVENOUS at 15:07

## 2024-09-08 RX ADMIN — SODIUM CHLORIDE 1000 ML: 9 INJECTION, SOLUTION INTRAVENOUS at 09:33

## 2024-09-08 RX ADMIN — HYDROMORPHONE HYDROCHLORIDE 0.5 MG: 1 INJECTION, SOLUTION INTRAMUSCULAR; INTRAVENOUS; SUBCUTANEOUS at 16:17

## 2024-09-08 RX ADMIN — IOPAMIDOL 100 ML: 612 INJECTION, SOLUTION INTRAVENOUS at 09:43

## 2024-09-08 RX ADMIN — PIPERACILLIN SODIUM AND TAZOBACTAM SODIUM 3.38 G: 3; .375 INJECTION, POWDER, LYOPHILIZED, FOR SOLUTION INTRAVENOUS at 10:39

## 2024-09-08 RX ADMIN — ONDANSETRON 4 MG: 2 INJECTION INTRAMUSCULAR; INTRAVENOUS at 15:19

## 2024-09-08 RX ADMIN — MELOXICAM 7.5 MG: 7.5 TABLET ORAL at 17:55

## 2024-09-08 RX ADMIN — DEXAMETHASONE SODIUM PHOSPHATE 8 MG: 4 INJECTION INTRA-ARTICULAR; INTRALESIONAL; INTRAMUSCULAR; INTRAVENOUS; SOFT TISSUE at 14:54

## 2024-09-08 RX ADMIN — ACETAMINOPHEN 500 MG: 500 TABLET ORAL at 17:55

## 2024-09-08 RX ADMIN — SUGAMMADEX 200 MG: 100 INJECTION, SOLUTION INTRAVENOUS at 15:35

## 2024-09-08 RX ADMIN — LIDOCAINE HYDROCHLORIDE 50 MG: 10 INJECTION, SOLUTION EPIDURAL; INFILTRATION; INTRACAUDAL; PERINEURAL at 14:43

## 2024-09-08 RX ADMIN — FENTANYL CITRATE 50 MCG: 50 INJECTION, SOLUTION INTRAMUSCULAR; INTRAVENOUS at 16:20

## 2024-09-08 RX ADMIN — FENTANYL CITRATE 100 MCG: 50 INJECTION, SOLUTION INTRAMUSCULAR; INTRAVENOUS at 14:41

## 2024-09-08 RX ADMIN — MORPHINE SULFATE 2 MG: 2 INJECTION, SOLUTION INTRAMUSCULAR; INTRAVENOUS at 12:09

## 2024-09-08 RX ADMIN — HYDROCODONE BITARTRATE AND ACETAMINOPHEN 1 TABLET: 5; 325 TABLET ORAL at 20:23

## 2024-09-08 RX ADMIN — SODIUM CHLORIDE, POTASSIUM CHLORIDE, SODIUM LACTATE AND CALCIUM CHLORIDE: 600; 310; 30; 20 INJECTION, SOLUTION INTRAVENOUS at 14:30

## 2024-09-08 RX ADMIN — DROPERIDOL 0.62 MG: 2.5 INJECTION, SOLUTION INTRAMUSCULAR; INTRAVENOUS at 16:18

## 2024-09-08 RX ADMIN — KETOROLAC TROMETHAMINE 15 MG: 30 INJECTION, SOLUTION INTRAMUSCULAR; INTRAVENOUS at 15:32

## 2024-09-08 RX ADMIN — SODIUM CHLORIDE, POTASSIUM CHLORIDE, SODIUM LACTATE AND CALCIUM CHLORIDE 150 ML/HR: 600; 310; 30; 20 INJECTION, SOLUTION INTRAVENOUS at 12:11

## 2024-09-08 RX ADMIN — FAMOTIDINE 20 MG: 10 INJECTION, SOLUTION INTRAVENOUS at 12:09

## 2024-09-08 RX ADMIN — PIPERACILLIN AND TAZOBACTAM 3.38 G: 3; .375 INJECTION, POWDER, LYOPHILIZED, FOR SOLUTION INTRAVENOUS at 17:54

## 2024-09-08 RX ADMIN — DEXMEDETOMIDINE HYDROCHLORIDE IN 0.9% SODIUM CHLORIDE 20 MCG: 4 INJECTION INTRAVENOUS at 14:46

## 2024-09-08 RX ADMIN — PIPERACILLIN AND TAZOBACTAM 3.38 G: 3; .375 INJECTION, POWDER, LYOPHILIZED, FOR SOLUTION INTRAVENOUS at 23:25

## 2024-09-08 RX ADMIN — MIDAZOLAM HYDROCHLORIDE 2 MG: 1 INJECTION, SOLUTION INTRAMUSCULAR; INTRAVENOUS at 14:30

## 2024-09-08 RX ADMIN — PROPOFOL 200 MG: 10 INJECTION, EMULSION INTRAVENOUS at 14:43

## 2024-09-08 RX ADMIN — GLYCOPYRROLATE 0.4 MCG: 0.2 INJECTION INTRAMUSCULAR; INTRAVENOUS at 15:01

## 2024-09-08 RX ADMIN — ACETAMINOPHEN 500 MG: 500 TABLET ORAL at 23:25

## 2024-09-08 NOTE — ED NOTES
" Joel Angelo    Nursing Report ED to Floor:  Mental status: A&ox4  Ambulatory status: up at [facility]  Oxygen Therapy:  ra  Cardiac Rhythm: ns  Admitted from: home  Safety Concerns:  no  Social Issues: no  ED Room #:  4    ED Nurse Phone Extension - [phone] or may call [phone].      HPI:   Chief Complaint   Patient presents with    Abdominal Pain       Past Medical History:  Past Medical History:   Diagnosis Date    Baires's esophagus         Past Surgical History:  No past surgical history on file.     Admitting Doctor:   No admitting provider for patient encounter.    Consulting Provider(s):  Consults       No orders found from 8/10/2024 to 9/9/2024.             Admitting Diagnosis:   The encounter diagnosis was Acute appendicitis with localized peritonitis, without perforation or abscess, unspecified whether gangrene present.    Most Recent Vitals:   Vitals:    09/08/24 0845   BP: 132/70   Pulse: 60   Resp: 18   Temp: 98.6 °F (37 °C)   SpO2: 98%   Weight: 105 kg (232 lb)   Height: 185.4 cm (73\")       Active LDAs/IV Access:   Lines, Drains & Airways       Active LDAs       Name Placement date Placement time Site Days    Peripheral IV 09/08/24 0933 Right Antecubital 09/08/24 0933  Antecubital  less than 1                    Labs (abnormal labs have a star):   Labs Reviewed   COMPREHENSIVE METABOLIC PANEL - Abnormal; Notable for the following components:       Result Value    Glucose 100 (*)     Alkaline Phosphatase 142 (*)     All other components within normal limits    Narrative:     GFR Normal >60  Chronic Kidney Disease <60  Kidney Failure <15     URINALYSIS W/ MICROSCOPIC IF INDICATED (NO CULTURE) - Normal    Narrative:     Urine microscopic not indicated.   LIPASE - Normal   CBC WITH AUTO DIFFERENTIAL - Normal   RAINBOW DRAW    Narrative:     The following orders were created for panel order [place] Draw.  Procedure                               Abnormality         Status                     ---------                   "             -----------         ------                     Green Top (Gel)[[id number]]                                  Final result               Lavender Top[[id number]]                                     Final result               Gold Top - SST[[id number]]                                   Final result               Contreras Top[[id number]]                                         Final result               Light Blue Top[[id number]]                                   Final result                 Please view results for these tests on the individual orders.   CBC AND DIFFERENTIAL    Narrative:     The following orders were created for panel order CBC & Differential.  Procedure                               Abnormality         Status                     ---------                               -----------         ------                     CBC Auto Differential[[id number]]        Normal              Final result                 Please view results for these tests on the individual orders.   GREEN TOP   LAVENDER TOP   GOLD TOP - SST   GRAY TOP   LIGHT BLUE TOP       Meds Given in ED:   Medications   sodium chloride 0.9 % flush 10 mL (has no administration in time range)   piperacillin-tazobactam (ZOSYN) 3.375 g IVPB in 100 mL NS MBP (CD) (has no administration in time range)   sodium chloride 0.9 % bolus 1,000 mL (1,000 mL Intravenous New Bag 9/8/24 0933)   iopamidol (ISOVUE-300) 61 % injection 100 mL (100 mL Intravenous Given 9/8/24 0943)           Last NIH score:                                                          Dysphagia screening results:        Yobany Coma Scale:  No data recorded     CIWA:        Restraint Type:            Isolation Status:  No active isolations

## 2024-09-08 NOTE — ANESTHESIA PREPROCEDURE EVALUATION
Anesthesia Evaluation     Patient summary reviewed and Nursing notes reviewed   no history of anesthetic complications:   NPO Solid Status: > 8 hours  NPO Liquid Status: > 2 hours           Airway   Mallampati: II  TM distance: >3 FB  Neck ROM: full  No difficulty expected  Dental - normal exam     Pulmonary - negative pulmonary ROS and normal exam    breath sounds clear to auscultation  Cardiovascular - negative cardio ROS and normal exam  Exercise tolerance: good (4-7 METS)    Rhythm: regular  Rate: normal        Neuro/Psych- negative ROS  GI/Hepatic/Renal/Endo    (+) obesity, GERD (Barretts Esophagus)    Musculoskeletal (-) negative ROS    Abdominal    Substance History - negative use     OB/GYN          Other   arthritis, autoimmune disease rheumatoid arthritis,     ROS/Med Hx Other: K 4.5              Anesthesia Plan    ASA 2     general     intravenous induction     Anesthetic plan, risks, benefits, and alternatives have been provided, discussed and informed consent has been obtained with: patient.    Plan discussed with CRNA.    CODE STATUS:

## 2024-09-08 NOTE — OP NOTE
General Surgery Operative Note    Joel Angelo  [id number]  1990    Date of Surgery:  9/8/2024 15:41 EDT    Pre-op Diagnosis: Acute appendicitis    Post-op Diagnosis: Acute appendicitis, nonperforated    Procedure: Laparoscopic appendectomy    Surgeon: Arnie Lopez MD    Anesthesia: General    Fluids: 1 L of crystalloid    Estimated Blood Loss: Less than 10 mL    Urine Voided: Not recorded    Specimens:  Appendix                  Drains: None    Findings: Acute appendicitis without perforation    Complications: None apparent    History: 34-year-old young gentleman presented to the emergency room with the acute onset of abdominal pain.  His workup and imaging was consistent with an acute appendicitis.       I rehashed the risks and benefits of appendectomy, including but not limited to: bleeding, infection, injury to adjacent viscera (small bowel, large bowel, the right ureter, the bladder, etc.), cecal stump leak, postoperative abscess formation, an open operation in general, need for re-intervention, and medical issues from a cardiopulmonary and deep venous thrombosis standpoint.  They understood these risks and wished to proceed.    Procedure:      After informed consent, the patient was taken to the operating room.  They were placed in the supine position on the operating table, general anesthesia administered, the abdomen was then prepped and draped in the standard sterile fashion.  A timeout was performed.       The abdomen was entered through a Yuliya trocar cutdown at the level of the umbilicus.  Bilateral Vicryl fascial sutures placed and the blunt-tipped Yuliya trocar placed intra-abdominally, then the abdomen was insufflated.  The patient was placed in the headdown position and rolled slightly onto the left-hand side. Suprapubic and left lower quadrant 5 mm trocars were placed under direct visualization.  The cecum was medially rotated demonstrating a distal acute appendicitis without obvious  rupture or abscess.  A window in the mesoappendix was created with the [place] dissector.  The laparoscopic FLORENCIO stapler with a vascular load was used to take the appendiceal base.  The [place] LigaSure was used to take the mesoappendix.  The appendix was then placed in an Endo Catch bag and withdrawn from the abdomen.  I then reinspected the cecal stump staple line and mesoappendix, they were both in good order.  The operative area, pelvis, and right upper quadrant were copiously irrigated till clear.  All trocars were removed under direct visualization.  The 10 mm fascial defect was closed with 0 Vicryl.  All skin incisions were closed with 4-0 Monocryl, Mastisol, Steri-Strips, Telfa, and Tegaderm.       All lap and needle counts were correct at the end of the procedure ×2.  The patient was then transferred to the recovery room in stable condition.     Of note, there was a needlestick with a hypodermic needle at the end of the case.  I discussed this with the patient's wife Tanisha Angelo regarding drawing his blood for infectious disease per protocol.  She understands and all her questions were answered.    Arnie Lopez MD     Date: 9/8/2024  Time: 15:41 EDT

## 2024-09-08 NOTE — ANESTHESIA POSTPROCEDURE EVALUATION
Patient: Joel Angelo    Procedure Summary       Date: 09/08/24 Room / Location:  [facility] /  [facility]    Anesthesia Start: 1434 Anesthesia Stop: 1552    Procedure: APPENDECTOMY LAPAROSCOPIC (Abdomen) Diagnosis:     Surgeons: Arnie Lopez MD Provider: Rod Vegas MD    Anesthesia Type: general ASA Status: 2            Anesthesia Type: general    Vitals  Vitals Value Taken Time   /78 09/08/24 1559   Temp 97.2 °F (36.2 °C) 09/08/24 1559   Pulse 86 09/08/24 1559   Resp 13 09/08/24 1559   SpO2 99 % 09/08/24 1559           Post Anesthesia Care and Evaluation    Patient location during evaluation: PACU  Patient participation: complete - patient participated  Level of consciousness: awake and alert  Pain management: adequate    Airway patency: patent  Anesthetic complications: No anesthetic complications  PONV Status: none  Cardiovascular status: hemodynamically stable and acceptable  Respiratory status: nonlabored ventilation, acceptable and nasal cannula  Hydration status: acceptable

## 2024-09-08 NOTE — FSED PROVIDER NOTE
Subjective  History of Present Illness:    This is a 34-year-old male who presents with complaints of right lower quadrant abdominal pain that started yesterday, was mild and nonspecific.  He states that he went to bed and the pain woke him up in the middle of the night.  He if he lays on his right side he has worsening pain.  If he ambulates he has worsening pain.  He states that laying with his knees flexed help his pain.  He has had nausea without vomiting.  No diarrhea, has had constipation which is unusual for him.  He has not wanted to eat or drink anything today.  He denies any fevers.  No abdominal surgical history.  Patient takes no medications daily.  He has not taken any medications for pain.      Nurses Notes reviewed and agree, including vitals, allergies, social history and prior medical history.     REVIEW OF SYSTEMS: All systems reviewed and not pertinent unless noted.  Review of Systems    Past Medical History:   Diagnosis Date    Baires's esophagus        Allergies:    Methotrexate derivatives      No past surgical history on file.      Social History     Socioeconomic History    Marital status:    Tobacco Use    Smoking status: Former     Current packs/day: 0.00     Average packs/day: 0.3 packs/day for 2.9 years (0.7 ttl pk-yrs)     Types: Cigarettes     Start date: 2010     Quit date: 2012     Years since quittin.7    Smokeless tobacco: Former   Vaping Use    Vaping status: Never Used   Substance and Sexual Activity    Alcohol use: Not Currently     Alcohol/week: 1.0 standard drink of alcohol     Types: 1 Drinks containing 0.5 oz of alcohol per week    Drug use: Never    Sexual activity: Yes     Partners: Female     Birth control/protection: Hysterectomy         Family History   Problem Relation Age of Onset    Diabetes type II Father     Rheum arthritis Father     Diabetes type II Sister        Objective  Physical Exam:  /70   Pulse 60   Temp 98.6 °F (37 °C)    "Resp 18   Ht 185.4 cm (73\")   Wt 105 kg (232 lb)   SpO2 98%   BMI 30.61 kg/m²      Physical Exam  Vitals and nursing note reviewed.   Constitutional:       Appearance: He is well-developed.   Cardiovascular:      Rate and Rhythm: Normal rate and regular rhythm.   Pulmonary:      Effort: Pulmonary effort is normal.      Breath sounds: Normal breath sounds.   Abdominal:      General: Bowel sounds are normal.      Palpations: Abdomen is soft.      Tenderness: There is abdominal tenderness in the right lower quadrant. There is guarding (Voluntary guarding) and rebound. Positive signs include McBurney's sign.   Neurological:      Mental Status: He is alert.         Procedures    ED Course:         Lab Results (last 24 hours)       Procedure Component Value Units Date/Time    CBC & Differential [[id number]]  (Normal) Collected: 09/08/24 0935    Specimen: Blood Updated: 09/08/24 0943    Narrative:      The following orders were created for panel order CBC & Differential.  Procedure                               Abnormality         Status                     ---------                               -----------         ------                     CBC Auto Differential[[id number]]        Normal              Final result                 Please view results for these tests on the individual orders.    Comprehensive Metabolic Panel [[id number]]  (Abnormal) Collected: 09/08/24 0935    Specimen: Blood Updated: 09/08/24 1001     Glucose 100 mg/dL      BUN 14 mg/dL      Creatinine 0.93 mg/dL      Sodium 141 mmol/L      Potassium 4.5 mmol/L      Chloride 103 mmol/L      CO2 26.5 mmol/L      Calcium 9.9 mg/dL      Total Protein 7.5 g/dL      Albumin 4.8 g/dL      ALT (SGPT) 29 U/L      AST (SGOT) 25 U/L      Alkaline Phosphatase 142 U/L      Total Bilirubin 0.5 mg/dL      Globulin 2.7 gm/dL      A/G Ratio 1.8 g/dL      BUN/Creatinine Ratio 15.1     Anion Gap 11.5 mmol/L      eGFR 110.5 mL/min/1.73     Narrative:      GFR Normal " >60  Chronic Kidney Disease <60  Kidney Failure <15      Lipase [[id number]]  (Normal) Collected: 09/08/24 0935    Specimen: Blood Updated: 09/08/24 1000     Lipase 29 U/L     CBC Auto Differential [[id number]]  (Normal) Collected: 09/08/24 0935    Specimen: Blood Updated: 09/08/24 0943     WBC 6.06 10*3/mm3      RBC 5.34 10*6/mm3      Hemoglobin 15.9 g/dL      Hematocrit 46.9 %      MCV 87.8 fL      MCH 29.8 pg      MCHC 33.9 g/dL      RDW 12.8 %      RDW-SD 41.5 fl      MPV 10.0 fL      Platelets 243 10*3/mm3      Neutrophil % 57.1 %      Lymphocyte % 30.2 %      Monocyte % 10.1 %      Eosinophil % 2.1 %      Basophil % 0.5 %      Immature Grans % 0.0 %      Neutrophils, Absolute 3.46 10*3/mm3      Lymphocytes, Absolute 1.83 10*3/mm3      Monocytes, Absolute 0.61 10*3/mm3      Eosinophils, Absolute 0.13 10*3/mm3      Basophils, Absolute 0.03 10*3/mm3      Immature Grans, Absolute 0.00 10*3/mm3     Urinalysis With Microscopic If Indicated (No Culture) - Urine, Clean Catch [[id number]]  (Normal) Collected: 09/08/24 1007    Specimen: Urine, Clean Catch Updated: 09/08/24 1019     Color, UA Yellow     Appearance, UA Clear     pH, UA 7.0     Specific Gravity, UA <=1.005     Glucose, UA Negative     Ketones, UA Negative     Bilirubin, UA Negative     Blood, UA Negative     Protein, UA Negative     Leuk Esterase, UA Negative     Nitrite, UA Negative     Urobilinogen, UA 0.2 E.U./dL    Narrative:      Urine microscopic not indicated.             CT Abdomen Pelvis With Contrast    Result Date: 9/8/2024  CT ABDOMEN PELVIS W CONTRAST Date of Exam: 9/8/2024 9:37 AM EDT Indication: Right lower quadrant abdominal pain. Comparison: None available. Technique: Axial CT images were obtained of the abdomen and pelvis following the uneventful intravenous administration of 100 mL Isovue-300. Reconstructed coronal and sagittal images were also obtained. Automated exposure control and iterative construction methods were used. Findings:  Liver: The liver is unremarkable in morphology. No focal liver lesion is seen. No biliary dilation is seen. Gallbladder: Unremarkable. Pancreas: Unremarkable. Spleen: Unremarkable. Adrenal glands: Unremarkable. Genitourinary tract: Probable small right renal cyst. Kidneys are otherwise unremarkable. No hydronephrosis is seen. The visualized portions of the ureters, urinary bladder, and prostate gland appear unremarkable. Gastrointestinal tract: The visualized hollow viscera demonstrate no focal lesion. There is no evidence of bowel obstruction. Appendix: Midportion of the appendix is focally dilated, measuring approximately 12 mm. Mild periappendiceal stranding is present. Findings are concerning for early appendicitis. No free air or abscess formation is identified. Other findings: No pathologically enlarged lymph nodes are seen. The abdominal aorta and IVC appear unremarkable. Bones and soft tissues: No acute or suspicious osseous or soft tissue lesion is identified. Lung bases: The visualized lung bases are clear.     Impression: Impression: 1.Midportion of the appendix is focally dilated, measuring approximately 12 mm. Mild periappendiceal stranding is present. Findings are concerning for early appendicitis. No free air or abscess formation is identified. Please correlate clinically. 2.Additional findings as detailed above. 3. Electronically Signed: Sheng Ivey MD  9/8/2024 9:55 AM EDT  Workstation ID: [id number]        MDM      Initial impression of presenting illness: This is a 34-year-old male who presents with complaints of right lower quadrant abdominal pain that started yesterday and got worse overnight.  Patient white blood cell count of 6.06.  Patient is afebrile.  CT scan revealed early appendicitis with mild periappendiceal stranding consistent with early appendicitis.  Discussed the patient with Dr. Lopez, general surgery who agreed to accept the patient for transfer.  Patient started on Zosyn,  given normal saline's.  Patient refused pain medication and nausea medication.  I discussed all of these findings and plan of care with the patient who is agreeable to transfer.    DDX: includes but is not limited to: Appendicitis, constipation, cholecystitis, bowel obstruction, gastroenteritis      Medications   sodium chloride 0.9 % flush 10 mL (has no administration in time range)   piperacillin-tazobactam (ZOSYN) 3.375 g IVPB in 100 mL NS MBP (CD) (has no administration in time range)   sodium chloride 0.9 % bolus 1,000 mL (1,000 mL Intravenous New Bag 9/8/24 0933)   iopamidol (ISOVUE-300) 61 % injection 100 mL (100 mL Intravenous Given 9/8/24 0943)       Data interpreted: Nursing notes reviewed, vital signs reviewed.  Labs independently interpreted by me (CBC, CMP, lipase, UA, troponin, ABG, lactic acid, procalcitonin).  Imaging independently interpreted by me (x-ray, CT scan).  EKG independently interpreted by me.  O2 saturation:    Counseling: Discussed the results above with the patient regarding need for admission or discharge.  Patient understands and agrees plan of care.      -----  ED Disposition       ED Disposition   Decision to Admit    Condition   --    Comment   Level of Care: Telemetry [5]   Accepting Provider:: DELBERT ROOT [[id number]]               Final diagnoses:   Acute appendicitis with localized peritonitis, without perforation or abscess, unspecified whether gangrene present     Your Follow-Up Providers    Follow-up information has not been specified.       Contact information for after-discharge care    Follow-up information has not been specified.          Your medication list        ASK your doctor about these medications        Instructions Last Dose Given Next Dose Due   omeprazole 20 MG capsule  Commonly known as: priLOSEC      Take 1 capsule by mouth Daily.

## 2024-09-08 NOTE — H&P
General Surgery History and Physical    Mynor Luna MD    Joelandrea Angelo  [id number]  1990    Date of Service: 9/8/2024    Reason for Admission: Acute appendicitis       History of Present Illness: 34-year-old gentleman presents with the acute onset of acute severe abdominal pain beginning yesterday.  Initially this was periumbilical and it is now more located in the right lower quadrant.  It has been persistent with a crescendoing course.  He has had some nausea though he denies fever, chills, vomiting, or significant change in bowel habits.  He has not had any symptoms similar to this in the past.  Otherwise there are no significant modifying factors nor associated symptoms      Past Medical History:   Diagnosis Date    Baires's esophagus      Past surgical history: Inguinal hernia repair as an infant    Allergies   Allergen Reactions    Methotrexate Derivatives Other (See Comments)     Patient felt unwell       No current facility-administered medications on file prior to encounter.     Current Outpatient Medications on File Prior to Encounter   Medication Sig Dispense Refill    omeprazole (priLOSEC) 20 MG capsule Take 1 capsule by mouth Daily. 90 capsule 3         Current Facility-Administered Medications:     famotidine (PEPCID) injection 20 mg, 20 mg, Intravenous, BID, Arnie Lopez MD    [START ON 9/9/2024] heparin (porcine) 5000 UNIT/ML injection 5,000 Units, 5,000 Units, Subcutaneous, Q8H, Arnie Lopez MD    lactated ringers infusion, 150 mL/hr, Intravenous, Continuous, Arnie Lopez MD    morphine injection 2 mg, 2 mg, Intravenous, Q2H PRN **AND** naloxone (NARCAN) injection 0.4 mg, 0.4 mg, Intravenous, Q5 Min PRN, Arnie Lopez MD    ondansetron ODT (ZOFRAN-ODT) disintegrating tablet 4 mg, 4 mg, Oral, Q6H PRN **OR** ondansetron (ZOFRAN) injection 4 mg, 4 mg, Intravenous, Q6H PRN, Arnie Lopez MD    piperacillin-tazobactam (ZOSYN) 3.375 g  IVPB in 100 mL NS MBP (CD), 3.375 g, Intravenous, Once, Jocelyne Adames PA-C    [COMPLETED] Insert Peripheral IV, , , Once **AND** sodium chloride 0.9 % flush 10 mL, 10 mL, Intravenous, PRN, Jocelyne Adames PA-C    Current Outpatient Medications:     omeprazole (priLOSEC) 20 MG capsule, Take 1 capsule by mouth Daily., Disp: 90 capsule, Rfl: 3    No past surgical history on file.    Family History   Problem Relation Age of Onset    Diabetes type II Father     Rheum arthritis Father     Diabetes type II Sister      Social History     Socioeconomic History    Marital status:    Tobacco Use    Smoking status: Former     Current packs/day: 0.00     Average packs/day: 0.3 packs/day for 2.9 years (0.7 ttl pk-yrs)     Types: Cigarettes     Start date: 2010     Quit date: 2012     Years since quittin.7    Smokeless tobacco: Former   Vaping Use    Vaping status: Never Used   Substance and Sexual Activity    Alcohol use: Not Currently     Alcohol/week: 1.0 standard drink of alcohol     Types: 1 Drinks containing 0.5 oz of alcohol per week    Drug use: Never    Sexual activity: Yes     Partners: Female     Birth control/protection: Hysterectomy       Review of Systems:  Review of Systems   Constitutional:  Positive for appetite change. Negative for fever.   HENT:  Negative for congestion, ear discharge and mouth sores.    Eyes:  Negative for photophobia and visual disturbance.   Respiratory:  Negative for apnea, cough and wheezing.    Cardiovascular:  Negative for chest pain and leg swelling.   Gastrointestinal:  Positive for abdominal pain and nausea. Negative for blood in stool and vomiting.   Endocrine: Negative for polyphagia and polyuria.   Genitourinary:  Negative for dysuria, hematuria and urgency.   Musculoskeletal:  Negative for arthralgias and myalgias.   Skin:  Negative for pallor and rash.   Allergic/Immunologic: Negative for immunocompromised state.   Neurological:  Negative for syncope and facial  "asymmetry.   Hematological:  Negative for adenopathy.   Psychiatric/Behavioral:  Negative for agitation, dysphoric mood and self-injury.      Otherwise the 12 point review of systems is negative.    /70   Pulse 60   Temp 98.6 °F (37 °C)   Resp 18   Ht 185.4 cm (73\")   Wt 105 kg (232 lb)   SpO2 98%   BMI 30.61 kg/m²   Body mass index is 30.61 kg/m².    General: Pleasantly conversant laying in bed  HEENT: PER, no icterus, normal sclerae  Cardiac: regular rhythm,  no audible rubs  Pulmonary: bilateral breath sounds, non labored  Abdominal: Soft, tenderness noted in the right lower quadrant, no generalized peritonitis  Neurologic: awake, alert, no obvious focal deficits  Extremities: warm, no edema  Skin: no obvious rashes nor worrisome lesions seen     CBC  Results from last 7 days   Lab Units 09/08/24  0935   WBC 10*3/mm3 6.06   HEMOGLOBIN g/dL 15.9   HEMATOCRIT % 46.9   PLATELETS 10*3/mm3 243       CMP  Results from last 7 days   Lab Units 09/08/24  0935   SODIUM mmol/L 141   POTASSIUM mmol/L 4.5   CHLORIDE mmol/L 103   CO2 mmol/L 26.5   BUN mg/dL 14   CREATININE mg/dL 0.93   CALCIUM mg/dL 9.9   BILIRUBIN mg/dL 0.5   ALK PHOS U/L 142*   ALT (SGPT) U/L 29   AST (SGOT) U/L 25   GLUCOSE mg/dL 100*       Radiology  Imaging Results (Last 72 Hours)       Procedure Component Value Units Date/Time    CT Abdomen Pelvis With Contrast [[id number]] Collected: 09/08/24 0948     Updated: 09/08/24 0958    Narrative:      CT ABDOMEN PELVIS W CONTRAST    Date of Exam: 9/8/2024 9:37 AM EDT    Indication: Right lower quadrant abdominal pain.    Comparison: None available.    Technique: Axial CT images were obtained of the abdomen and pelvis following the uneventful intravenous administration of 100 mL Isovue-300. Reconstructed coronal and sagittal images were also obtained. Automated exposure control and iterative   construction methods were used.      Findings:    Liver: The liver is unremarkable in morphology. No focal " liver lesion is seen. No biliary dilation is seen.    Gallbladder: Unremarkable.    Pancreas: Unremarkable.    Spleen: Unremarkable.    Adrenal glands: Unremarkable.    Genitourinary tract: Probable small right renal cyst. Kidneys are otherwise unremarkable. No hydronephrosis is seen. The visualized portions of the ureters, urinary bladder, and prostate gland appear unremarkable.    Gastrointestinal tract: The visualized hollow viscera demonstrate no focal lesion. There is no evidence of bowel obstruction.    Appendix: Midportion of the appendix is focally dilated, measuring approximately 12 mm. Mild periappendiceal stranding is present. Findings are concerning for early appendicitis. No free air or abscess formation is identified.    Other findings: No pathologically enlarged lymph nodes are seen. The abdominal aorta and IVC appear unremarkable.    Bones and soft tissues: No acute or suspicious osseous or soft tissue lesion is identified.    Lung bases: The visualized lung bases are clear.      Impression:      Impression:  1.Midportion of the appendix is focally dilated, measuring approximately 12 mm. Mild periappendiceal stranding is present. Findings are concerning for early appendicitis. No free air or abscess formation is identified. Please correlate clinically.  2.Additional findings as detailed above.  3.    Electronically Signed: Sheng Ivey MD    9/8/2024 9:55 AM EDT    Workstation ID: [id number]          Assessment  Acute appendicitis  Baires's esophagus    Plan:  N.p.o., volume resuscitation, IV antibiotics, and appendectomy. The risks and benefits of appendectomy were discussed with the patient. Our discussion included, but was not limited to: bleeding, infection, injury to adjacent viscera (colon, small bowel, right ureter etc.), chronic pain, cecal/appendiceal stump leak, intra-abdominal abscess formation, need for percutaneous drainage, need for an open operation, and medical issues from a  cardiopulmonary and deep venous thrombosis standpoint.  They understand and wish to proceed with surgical intervention.  OR today.    Arnie Lopez MD  09/08/24  10:37 EDT

## 2024-09-08 NOTE — ANESTHESIA PROCEDURE NOTES
Airway  Urgency: elective    Date/Time: 9/8/2024 2:45 PM  Airway not difficult    General Information and Staff    Patient location during procedure: OR  Anesthesiologist: Rod Vegas MD    Indications and Patient Condition  Indications for airway management: airway protection    Preoxygenated: yes  MILS not maintained throughout  Mask difficulty assessment: 1 - vent by mask    Final Airway Details  Final airway type: endotracheal airway      Successful airway: ETT  Cuffed: yes   Successful intubation technique: video laryngoscopy  Facilitating devices/methods: intubating stylet  Endotracheal tube insertion site: oral  Blade: Chase  Blade size: 3  ETT size (mm): 7.0  Cormack-Lehane Classification: grade I - full view of glottis  Placement verified by: chest auscultation and capnometry   Measured from: lips  ETT/EBT  to lips (cm): 23  Number of attempts at approach: 1  Assessment: lips, teeth, and gum same as pre-op and atraumatic intubation    Additional Comments  Negative epigastric sounds, Breath sound equal bilaterally with symmetric chest rise and fall

## 2024-09-08 NOTE — PLAN OF CARE
Problem: Adult Inpatient Plan of Care  Goal: Plan of Care Review  Outcome: Ongoing, Progressing  Flowsheets  Taken 9/8/2024 1715 by Rajan Venegas, RN  Progress: improving  Outcome Evaluation: VSS. Continue care. DTV. No pain at this time. Fluids running.  Taken 9/8/2024 1630 by Yoselin Clayton, RN  Plan of Care Reviewed With:   patient   spouse   Goal Outcome Evaluation:           Progress: improving  Outcome Evaluation: VSS. Continue care. DTV. No pain at this time. Fluids running.

## 2024-09-09 ENCOUNTER — READMISSION MANAGEMENT (OUTPATIENT)
Dept: CALL CENTER | Facility: HOSPITAL | Age: 34
End: 2024-09-09
Payer: COMMERCIAL

## 2024-09-09 VITALS
HEART RATE: 68 BPM | TEMPERATURE: 98.3 F | WEIGHT: 232 LBS | HEIGHT: 73 IN | SYSTOLIC BLOOD PRESSURE: 122 MMHG | OXYGEN SATURATION: 93 % | DIASTOLIC BLOOD PRESSURE: 73 MMHG | BODY MASS INDEX: 30.75 KG/M2 | RESPIRATION RATE: 18 BRPM

## 2024-09-09 PROBLEM — K37 APPENDICITIS: Status: ACTIVE | Noted: 2024-09-09

## 2024-09-09 PROBLEM — K35.30 ACUTE APPENDICITIS WITH LOCALIZED PERITONITIS, WITHOUT PERFORATION, ABSCESS, OR GANGRENE: Status: RESOLVED | Noted: 2024-09-09 | Resolved: 2024-09-09

## 2024-09-09 PROBLEM — K35.30 ACUTE APPENDICITIS WITH LOCALIZED PERITONITIS, WITHOUT PERFORATION, ABSCESS, OR GANGRENE: Status: ACTIVE | Noted: 2024-09-09

## 2024-09-09 LAB
BASOPHILS # BLD AUTO: 0.01 10*3/MM3 (ref 0–0.2)
BASOPHILS NFR BLD AUTO: 0.1 % (ref 0–1.5)
DEPRECATED RDW RBC AUTO: 40.3 FL (ref 37–54)
EOSINOPHIL # BLD AUTO: 0 10*3/MM3 (ref 0–0.4)
EOSINOPHIL NFR BLD AUTO: 0 % (ref 0.3–6.2)
ERYTHROCYTE [DISTWIDTH] IN BLOOD BY AUTOMATED COUNT: 12.7 % (ref 12.3–15.4)
HCT VFR BLD AUTO: 44.5 % (ref 37.5–51)
HGB BLD-MCNC: 15.3 G/DL (ref 13–17.7)
IMM GRANULOCYTES # BLD AUTO: 0.05 10*3/MM3 (ref 0–0.05)
IMM GRANULOCYTES NFR BLD AUTO: 0.3 % (ref 0–0.5)
LYMPHOCYTES # BLD AUTO: 1.24 10*3/MM3 (ref 0.7–3.1)
LYMPHOCYTES NFR BLD AUTO: 8.7 % (ref 19.6–45.3)
MCH RBC QN AUTO: 29.9 PG (ref 26.6–33)
MCHC RBC AUTO-ENTMCNC: 34.4 G/DL (ref 31.5–35.7)
MCV RBC AUTO: 86.9 FL (ref 79–97)
MONOCYTES # BLD AUTO: 0.73 10*3/MM3 (ref 0.1–0.9)
MONOCYTES NFR BLD AUTO: 5.1 % (ref 5–12)
NEUTROPHILS NFR BLD AUTO: 12.27 10*3/MM3 (ref 1.7–7)
NEUTROPHILS NFR BLD AUTO: 85.8 % (ref 42.7–76)
NRBC BLD AUTO-RTO: 0 /100 WBC (ref 0–0.2)
PLATELET # BLD AUTO: 264 10*3/MM3 (ref 140–450)
PMV BLD AUTO: 10.1 FL (ref 6–12)
RBC # BLD AUTO: 5.12 10*6/MM3 (ref 4.14–5.8)
WBC NRBC COR # BLD AUTO: 14.3 10*3/MM3 (ref 3.4–10.8)

## 2024-09-09 PROCEDURE — G0378 HOSPITAL OBSERVATION PER HR: HCPCS

## 2024-09-09 PROCEDURE — 85025 COMPLETE CBC W/AUTO DIFF WBC: CPT | Performed by: SURGERY

## 2024-09-09 PROCEDURE — 25010000002 HEPARIN (PORCINE) PER 1000 UNITS: Performed by: SURGERY

## 2024-09-09 PROCEDURE — 25010000002 PIPERACILLIN SOD-TAZOBACTAM PER 1 G: Performed by: SURGERY

## 2024-09-09 RX ORDER — SIMETHICONE 80 MG
80 TABLET,CHEWABLE ORAL 4 TIMES DAILY PRN
Status: DISCONTINUED | OUTPATIENT
Start: 2024-09-09 | End: 2024-09-09 | Stop reason: HOSPADM

## 2024-09-09 RX ORDER — HYDROCODONE BITARTRATE AND ACETAMINOPHEN 5; 325 MG/1; MG/1
1 TABLET ORAL EVERY 8 HOURS PRN
Qty: 9 TABLET | Refills: 0 | Status: SHIPPED | OUTPATIENT
Start: 2024-09-09 | End: 2024-09-13

## 2024-09-09 RX ORDER — ONDANSETRON 4 MG/1
4 TABLET, ORALLY DISINTEGRATING ORAL EVERY 8 HOURS PRN
Qty: 5 TABLET | Refills: 0 | Status: SHIPPED | OUTPATIENT
Start: 2024-09-09

## 2024-09-09 RX ADMIN — HEPARIN SODIUM 5000 UNITS: 5000 INJECTION INTRAVENOUS; SUBCUTANEOUS at 05:33

## 2024-09-09 RX ADMIN — PIPERACILLIN AND TAZOBACTAM 3.38 G: 3; .375 INJECTION, POWDER, LYOPHILIZED, FOR SOLUTION INTRAVENOUS at 06:35

## 2024-09-09 RX ADMIN — SIMETHICONE 80 MG: 80 TABLET, CHEWABLE ORAL at 05:36

## 2024-09-09 RX ADMIN — HYDROCODONE BITARTRATE AND ACETAMINOPHEN 1 TABLET: 5; 325 TABLET ORAL at 08:50

## 2024-09-09 RX ADMIN — MELOXICAM 7.5 MG: 7.5 TABLET ORAL at 08:50

## 2024-09-09 RX ADMIN — Medication 1 TABLET: at 08:50

## 2024-09-09 RX ADMIN — PANTOPRAZOLE SODIUM 40 MG: 40 TABLET, DELAYED RELEASE ORAL at 05:33

## 2024-09-09 RX ADMIN — SIMETHICONE 80 MG: 80 TABLET, CHEWABLE ORAL at 00:18

## 2024-09-09 RX ADMIN — ACETAMINOPHEN 500 MG: 500 TABLET ORAL at 05:33

## 2024-09-09 RX ADMIN — HYDROCODONE BITARTRATE AND ACETAMINOPHEN 1 TABLET: 5; 325 TABLET ORAL at 02:46

## 2024-09-09 NOTE — DISCHARGE SUMMARY
General Surgery Discharge Note (Dr. KAROL Lopez)    Mynor Luna MD    Patient Name:  Joel Angelo  Admission Date:  9/8/2024  Discharge Date:  9/9/2024    Diagnosis/Problems:  Problems Addressed this Visit    None  Visit Diagnoses       Acute appendicitis with localized peritonitis, without perforation or abscess, unspecified whether gangrene present    -  Primary    Appendicitis        Relevant Orders    Tissue Pathology Exam    Acute appendicitis with localized peritonitis, without perforation, abscess, or gangrene        Relevant Medications    HYDROcodone-acetaminophen (NORCO) 5-325 MG per tablet          Diagnoses         Codes Comments    Acute appendicitis with localized peritonitis, without perforation or abscess, unspecified whether gangrene present    -  Primary ICD-10-CM: K35.30  ICD-9-CM: 540.0     Appendicitis     ICD-10-CM: K37  ICD-9-CM: 541     Acute appendicitis with localized peritonitis, without perforation, abscess, or gangrene     ICD-10-CM: K35.30  ICD-9-CM: 540.9             History & Hospital Course: 34-year-old young gentleman presented to the emergency room with abdominal pain.  His history and clinical workup were consistent with an acute appendicitis.  He was taken to the operating room on 9/8/2024 for an uneventful laparoscopic appendectomy.  His postoperative recovery has been equally uneventful. The patient is tolerating an appropriate diet and is ready to be discharged.    Temp:  [96.2 °F (35.7 °C)-98.4 °F (36.9 °C)] 98.3 °F (36.8 °C)  Heart Rate:  [56-86] 68  Resp:  [13-18] 18  BP: (104-144)/(62-85) 122/73    Physical: appropriate post operative examination.      Assessment:  Doing well may discharge home.    Plan:    Discharge Home  Diet: Regular diet as tolerated.  Restrictions: No heavy lifting greater than 10 lbs x6 weeks.  May shower as needed, no tub bathes.  Do not submerge the incisions underwater.    Prescriptions: Norco 5 mg tablets #9, no refills                  Zofran 4 mg tablets #5, no refill    May resume all prior home medications.  No current facility-administered medications on file prior to encounter.     Current Outpatient Medications on File Prior to Encounter   Medication Sig Dispense Refill    omeprazole (priLOSEC) 20 MG capsule Take 1 capsule by mouth Daily. 90 capsule 3        Follow up in 2 weeks at [facility], [phone].    Arnie Lopez MD,  9/9/2024 - 09:00 EDT

## 2024-09-09 NOTE — CASE MANAGEMENT/SOCIAL WORK
Case Management Discharge Note      Final Note: I met with the patient at the bedside. He is returning home today. He denied any CM needs. He plans for his wife to transport him home. He reported that he was independent at home prior to admission.         Selected Continued Care - Admitted Since 9/8/2024       Destination    No services have been selected for the patient.                Durable Medical Equipment    No services have been selected for the patient.                Dialysis/Infusion    No services have been selected for the patient.                Home Medical Care    No services have been selected for the patient.                Therapy    No services have been selected for the patient.                Community Resources    No services have been selected for the patient.                Community & DME    No services have been selected for the patient.                         Final Discharge Disposition Code: 01 - home or self-care

## 2024-09-09 NOTE — OUTREACH NOTE
Prep Survey      Flowsheet Row Responses   [facility] patient discharged from? [place]   Is LACE score < 7 ? Yes   Eligibility [facility]   Date of Admission 09/08/24   Date of Discharge 09/09/24   Discharge Disposition Home or Self Care   Discharge diagnosis Acute appendicitis with localized peritonitis, without perforation or abscess, unspecified whether gangrene presenlaparoscopic appendectomy.   Does the patient have one of the following disease processes/diagnoses(primary or secondary)? General Surgery   Does the patient have Home health ordered? No   Is there a DME ordered? No   Prep survey completed? Yes            MEL TSAI - Registered Nurse

## 2024-09-09 NOTE — PLAN OF CARE
Goal Outcome Evaluation:                                       VSS. Tolerating PO. Sites CDI with scant drainage. Reports pain--see PRNs. Ambulated this shift. [place] ADAN.

## 2024-09-09 NOTE — PLAN OF CARE
Goal Outcome Evaluation:   VSS. Pt tolerating regular diet. Voiding without difficulty. Pain managed with pain meds. Discharge teaching done with pt and wife. Voiced understanding. Pt d/c'd via wheelchair to car.

## 2024-09-10 ENCOUNTER — TRANSITIONAL CARE MANAGEMENT TELEPHONE ENCOUNTER (OUTPATIENT)
Dept: CALL CENTER | Facility: HOSPITAL | Age: 34
End: 2024-09-10
Payer: COMMERCIAL

## 2024-09-10 NOTE — OUTREACH NOTE
Call Center TCM Note      Flowsheet Row Responses   [facility], [place], operated by [facility] patient discharged from? [place]   Does the patient have one of the following disease processes/diagnoses(primary or secondary)? General Surgery   TCM attempt successful? Yes   Call start time 0813   Call end time 0817   Discharge diagnosis Acute appendicitis with localized peritonitis, without perforation or abscess, unspecified whether gangrene presenlaparoscopic appendectomy.   Meds reviewed with patient/caregiver? Yes   Is the patient having any side effects they believe may be caused by any medication additions or changes? No   Does the patient have all medications related to this admission filled (includes all antibiotics, pain medications, etc.) Yes   Is the patient taking all medications as directed (includes completed medication regime)? Yes   Comments Surgeon on 9/24 @ 9:00   Does the patient have an appointment with their PCP within 7-14 days of discharge? No   Nursing Interventions Patient desires to follow up with specialty only   Psychosocial issues? No   Did the patient receive a copy of their discharge instructions? Yes   Nursing interventions Reviewed instructions with patient   What is the patient's perception of their health status since discharge? Improving   Nursing interventions Nurse provided patient education   Is the patient /caregiver able to teach back basic post-op care? Drive as instructed by MD in discharge instructions, Practice 'cough and deep breath', Continue use of incentive spirometry at least 1 week post discharge, No tub bath, swimming, or hot tub until instructed by MD, Do not remove steri-strips, Lifting as instructed by MD in discharge instructions   Is the patient/caregiver able to teach back signs and symptoms of incisional infection? Fever, Pus or odor from incision, Incisional warmth, Increased drainage or bleeding, Increased redness, swelling or pain at the incisonal site   Is the patient/caregiver  able to teach back steps to recovery at home? Rest and rebuild strength, gradually increase activity, Eat a well-balance diet, Set small, achievable goals for return to baseline health, Make a list of questions for surgeon's appointment   If the patient is a current smoker, are they able to teach back resources for cessation? Not a smoker   Is the patient/caregiver able to teach back the hierarchy of who to call/visit for symptoms/problems? PCP, Specialist, Home health nurse, Urgent Care, ED, 911 Yes   Additional teach back comments States he is doing well and slept well last night.  Has not had much of an appetite.  Encouraged protein shakes til  appetite improves.   TCM call completed? Yes   Wrap up additional comments Denies quesitons or needs at this time.   Call end time 0817            Tran Gongora LPN    9/10/2024, 08:18 EDT

## 2024-09-12 LAB
CYTO UR: NORMAL
LAB AP CASE REPORT: NORMAL
LAB AP CLINICAL INFORMATION: NORMAL
PATH REPORT.FINAL DX SPEC: NORMAL
PATH REPORT.GROSS SPEC: NORMAL

## 2025-01-31 ENCOUNTER — APPOINTMENT (OUTPATIENT)
Facility: HOSPITAL | Age: 35
End: 2025-01-31
Payer: COMMERCIAL

## 2025-01-31 ENCOUNTER — HOSPITAL ENCOUNTER (EMERGENCY)
Facility: HOSPITAL | Age: 35
Discharge: HOME | End: 2025-01-31
Attending: EMERGENCY MEDICINE
Payer: COMMERCIAL

## 2025-01-31 VITALS
HEART RATE: 65 BPM | BODY MASS INDEX: 30.48 KG/M2 | DIASTOLIC BLOOD PRESSURE: 90 MMHG | SYSTOLIC BLOOD PRESSURE: 132 MMHG | TEMPERATURE: 98 F | WEIGHT: 230 LBS | RESPIRATION RATE: 16 BRPM | OXYGEN SATURATION: 93 % | HEIGHT: 73 IN

## 2025-01-31 DIAGNOSIS — I88.9 LYMPHADENITIS: Primary | ICD-10-CM

## 2025-01-31 LAB
ALBUMIN SERPL-MCNC: 4.5 G/DL (ref 3.5–5.2)
ALBUMIN/GLOB SERPL: 1.7 G/DL
ALP SERPL-CCNC: 120 U/L (ref 39–117)
ALT SERPL W P-5'-P-CCNC: 27 U/L (ref 1–41)
ANION GAP SERPL CALCULATED.3IONS-SCNC: 14.1 MMOL/L (ref 5–15)
AST SERPL-CCNC: 26 U/L (ref 1–40)
BASOPHILS # BLD AUTO: 0.05 10*3/MM3 (ref 0–0.2)
BASOPHILS NFR BLD AUTO: 0.8 % (ref 0–1.5)
BILIRUB SERPL-MCNC: 0.2 MG/DL (ref 0–1.2)
BUN SERPL-MCNC: 14 MG/DL (ref 6–20)
BUN/CREAT SERPL: 15.1 (ref 7–25)
CALCIUM SPEC-SCNC: 9.5 MG/DL (ref 8.6–10.5)
CHLORIDE SERPL-SCNC: 103 MMOL/L (ref 98–107)
CO2 SERPL-SCNC: 24.9 MMOL/L (ref 22–29)
CREAT SERPL-MCNC: 0.93 MG/DL (ref 0.76–1.27)
DEPRECATED RDW RBC AUTO: 40.6 FL (ref 37–54)
EGFRCR SERPLBLD CKD-EPI 2021: 110.5 ML/MIN/1.73
EOSINOPHIL # BLD AUTO: 0.13 10*3/MM3 (ref 0–0.4)
EOSINOPHIL NFR BLD AUTO: 2 % (ref 0.3–6.2)
ERYTHROCYTE [DISTWIDTH] IN BLOOD BY AUTOMATED COUNT: 12.3 % (ref 12.3–15.4)
GLOBULIN UR ELPH-MCNC: 2.7 GM/DL
GLUCOSE SERPL-MCNC: 97 MG/DL (ref 65–99)
HCT VFR BLD AUTO: 43.7 % (ref 37.5–51)
HGB BLD-MCNC: 14.9 G/DL (ref 13–17.7)
IMM GRANULOCYTES # BLD AUTO: 0 10*3/MM3 (ref 0–0.05)
IMM GRANULOCYTES NFR BLD AUTO: 0 % (ref 0–0.5)
LYMPHOCYTES # BLD AUTO: 2.44 10*3/MM3 (ref 0.7–3.1)
LYMPHOCYTES NFR BLD AUTO: 37.8 % (ref 19.6–45.3)
MCH RBC QN AUTO: 29.7 PG (ref 26.6–33)
MCHC RBC AUTO-ENTMCNC: 34.1 G/DL (ref 31.5–35.7)
MCV RBC AUTO: 87.2 FL (ref 79–97)
MONOCYTES # BLD AUTO: 0.58 10*3/MM3 (ref 0.1–0.9)
MONOCYTES NFR BLD AUTO: 9 % (ref 5–12)
NEUTROPHILS NFR BLD AUTO: 3.26 10*3/MM3 (ref 1.7–7)
NEUTROPHILS NFR BLD AUTO: 50.4 % (ref 42.7–76)
PLATELET # BLD AUTO: 253 10*3/MM3 (ref 140–450)
PMV BLD AUTO: 10 FL (ref 6–12)
POTASSIUM SERPL-SCNC: 3.9 MMOL/L (ref 3.5–5.2)
PROT SERPL-MCNC: 7.2 G/DL (ref 6–8.5)
RBC # BLD AUTO: 5.01 10*6/MM3 (ref 4.14–5.8)
SODIUM SERPL-SCNC: 142 MMOL/L (ref 136–145)
WBC NRBC COR # BLD AUTO: 6.46 10*3/MM3 (ref 3.4–10.8)

## 2025-01-31 PROCEDURE — 99285 EMERGENCY DEPT VISIT HI MDM: CPT

## 2025-01-31 PROCEDURE — 85025 COMPLETE CBC W/AUTO DIFF WBC: CPT | Performed by: EMERGENCY MEDICINE

## 2025-01-31 PROCEDURE — 70491 CT SOFT TISSUE NECK W/DYE: CPT

## 2025-01-31 PROCEDURE — 80053 COMPREHEN METABOLIC PANEL: CPT | Performed by: EMERGENCY MEDICINE

## 2025-01-31 PROCEDURE — 25510000001 IOPAMIDOL 61 % SOLUTION: Performed by: EMERGENCY MEDICINE

## 2025-01-31 RX ORDER — OXYCODONE HYDROCHLORIDE 5 MG/1
5 TABLET ORAL ONCE
Status: DISCONTINUED | OUTPATIENT
Start: 2025-01-31 | End: 2025-01-31 | Stop reason: HOSPADM

## 2025-01-31 RX ORDER — IOPAMIDOL 612 MG/ML
100 INJECTION, SOLUTION INTRAVASCULAR
Status: COMPLETED | OUTPATIENT
Start: 2025-01-31 | End: 2025-01-31

## 2025-01-31 RX ORDER — OXYCODONE HYDROCHLORIDE 5 MG/1
5 TABLET ORAL EVERY 6 HOURS PRN
Qty: 12 TABLET | Refills: 0 | Status: SHIPPED | OUTPATIENT
Start: 2025-01-31

## 2025-01-31 RX ADMIN — IOPAMIDOL 75 ML: 612 INJECTION, SOLUTION INTRAVENOUS at 13:07

## 2025-01-31 NOTE — DISCHARGE INSTRUCTIONS
Please follow-up with primary care.  If your symptoms or not improving with conservative therapy you may require further ultrasound and ENT follow-up has occurred in 2012.

## 2025-01-31 NOTE — FSED PROVIDER NOTE
Subjective  History of Present Illness:    Patient said 3 days of pain on the left side of his mandible at the angle of the mandible.  He has had prior lymphadenopathy at that area.  No preceding symptoms.  He feels like the area is getting bigger and the pain is worse.  No trismus.  No difficulty swallowing.  He states ibuprofen is not helping with the pain.    Patient had cervical lymphadenopathy in .  He had a tender node in the left upper neck just below the submandibular salivary gland.  This was treated and antibiotics but did not improve.  He was recommended to have a lymph node biopsy at that time.  This was performed by ENT.  I was able to review these notes.  He healed well from the surgery.  This was determined to be a reactive lymph node without lymphoma or tumor on biopsy.    Nurses Notes reviewed and agree, including vitals, allergies, social history and prior medical history.     REVIEW OF SYSTEMS: All systems reviewed and not pertinent unless noted.    Past Medical History:   Diagnosis Date    Baires's esophagus        Allergies:    Methotrexate derivatives      Past Surgical History:   Procedure Laterality Date    APPENDECTOMY N/A 2024    Procedure: APPENDECTOMY LAPAROSCOPIC;  Surgeon: Arnie Lopez MD;  Location: [facility];  Service: General;  Laterality: N/A;    DEEP NECK LYMPH NODE BIOPSY / EXCISION           Social History     Socioeconomic History    Marital status:    Tobacco Use    Smoking status: Former     Current packs/day: 0.00     Average packs/day: 0.3 packs/day for 2.9 years (0.7 ttl pk-yrs)     Types: Cigarettes     Start date: 2010     Quit date: 2012     Years since quittin.1     Passive exposure: Never    Smokeless tobacco: Former   Vaping Use    Vaping status: Never Used   Substance and Sexual Activity    Alcohol use: Not Currently     Alcohol/week: 1.0 standard drink of alcohol     Types: 1 Drinks containing 0.5 oz of alcohol per week     "Drug use: Never    Sexual activity: Yes     Partners: Female     Birth control/protection: Hysterectomy         Family History   Problem Relation Age of Onset    Diabetes type II Father     Rheum arthritis Father     Diabetes type II Sister        Objective  Physical Exam:  /90   Pulse 65   Temp 98 °F (36.7 °C) (Oral)   Resp 16   Ht 185.4 cm (73\")   Wt 104 kg (230 lb)   SpO2 93%   BMI 30.34 kg/m²      Physical Exam    Primary Survey    Airway: Patent and protected  Breathing: Symmetric bilaterally  Circulation: Mentating well, responsive        Constitutional: Nontoxic appearance.  Psychological: No abnormalities of mood affect.  Head: Atraumatic  Eyes: Conjunctiva are non-injected. no scleral icterus.  ENT: No obvious congestion or obstruction noted  Neck: No obvious deformity.  ROM appears preserved mild tenderness noted at the left side of mandible at the angle of the mandible, had a small tender nodule at that area, no overlying erythema.  No fluctuance  Chest: No deformity noted.  No paradoxical breathing noted  Respiratory: Respiratory effort was normal - no use of accessory respiratory muscles noted.  There is no stridor.  Cardiovascular: Perfusion appears preserved - mentating well RRR no murmurs  Gastrointestinal: Abdomen nondistended.  Genitourinary: Not examined  Lymphatic: Not examined  Back: Not examined  Musculoskeletal: Musculoskeletal system is grossly intact.  There is no obvious deformity.  Neurological: Face: No Asymmetry.  Gross motor movement is intact in all 4 extremities.  Walks and ambulates without difficulty.  Patient exhibits normal speech.  Skin: No Pallor no obvious bruising.  No obvious rash.      ED Course:    Lab Results (last 24 hours)       Procedure Component Value Units Date/Time    CBC & Differential [[id number]]  (Normal) Collected: 01/31/25 1235    Specimen: Blood Updated: 01/31/25 1247    Narrative:      The following orders were created for panel order CBC & " Differential.  Procedure                               Abnormality         Status                     ---------                               -----------         ------                     CBC Auto Differential[[id number]]        Normal              Final result                 Please view results for these tests on the individual orders.    Comprehensive Metabolic Panel [[id number]]  (Abnormal) Collected: 01/31/25 1235    Specimen: Blood Updated: 01/31/25 1308     Glucose 97 mg/dL      BUN 14 mg/dL      Creatinine 0.93 mg/dL      Sodium 142 mmol/L      Potassium 3.9 mmol/L      Chloride 103 mmol/L      CO2 24.9 mmol/L      Calcium 9.5 mg/dL      Total Protein 7.2 g/dL      Albumin 4.5 g/dL      ALT (SGPT) 27 U/L      AST (SGOT) 26 U/L      Alkaline Phosphatase 120 U/L      Total Bilirubin 0.2 mg/dL      Globulin 2.7 gm/dL      A/G Ratio 1.7 g/dL      BUN/Creatinine Ratio 15.1     Anion Gap 14.1 mmol/L      eGFR 110.5 mL/min/1.73     Narrative:      GFR Categories in Chronic Kidney Disease (CKD)      GFR Category          GFR (mL/min/1.73)    Interpretation  G1                     90 or greater         Normal or high (1)  G2                      60-89                Mild decrease (1)  G3a                   45-59                Mild to moderate decrease  G3b                   30-44                Moderate to severe decrease  G4                    15-29                Severe decrease  G5                    14 or less           Kidney failure          (1)In the absence of evidence of kidney disease, neither GFR category G1 or G2 fulfill the criteria for CKD.    eGFR calculation 2021 CKD-EPI creatinine equation, which does not include race as a factor    CBC Auto Differential [[id number]]  (Normal) Collected: 01/31/25 1235    Specimen: Blood Updated: 01/31/25 1247     WBC 6.46 10*3/mm3      RBC 5.01 10*6/mm3      Hemoglobin 14.9 g/dL      Hematocrit 43.7 %      MCV 87.2 fL      MCH 29.7 pg      MCHC 34.1 g/dL      RDW 12.3  %      RDW-SD 40.6 fl      MPV 10.0 fL      Platelets 253 10*3/mm3      Neutrophil % 50.4 %      Lymphocyte % 37.8 %      Monocyte % 9.0 %      Eosinophil % 2.0 %      Basophil % 0.8 %      Immature Grans % 0.0 %      Neutrophils, Absolute 3.26 10*3/mm3      Lymphocytes, Absolute 2.44 10*3/mm3      Monocytes, Absolute 0.58 10*3/mm3      Eosinophils, Absolute 0.13 10*3/mm3      Basophils, Absolute 0.05 10*3/mm3      Immature Grans, Absolute 0.00 10*3/mm3              CT Soft Tissue Neck With Contrast    Result Date: 1/31/2025  CT SOFT TISSUE NECK W CONTRAST Date of Exam: 1/31/2025 12:59 PM EST Indication: pain at left side angle mandible, nodule. Comparison: None available. Technique: Axial CT images were obtained of the neck after the uneventful intravenous administration of 75 mL Isovue-300.  Reconstructed coronal and sagittal images were also obtained. Automated exposure control and iterative construction methods were used. Findings: Limited intracranial evaluation demonstrates no acute findings. The orbits appear normal. The paranasal sinuses are grossly clear. The parotid and submandibular glands appear normal and symmetric. There is no evidence of aerodigestive tract mass or other  focal luminal abnormality. The lung apices are clear. Vascular structures appear grossly patent throughout. Small bilateral cervical lymph nodes are present, without distinct abnormal morphology or pathologic enlargement. Within the marked area of interest on the left, there is no evidence of underlying unexpected soft tissue mass or focal fluid collection. 1-2 normal-appearing lymph nodes are present adjacent. The osseous structures demonstrate no evidence of acute fracture or suspicious focal osseous lesion.     Impression: Impression: No evidence of focal soft tissue abnormality or abnormal fluid collection present in the marked area of interest on the left to specifically account for reported palpable finding. If there is  persistent clinical concern and this finding persists, ultrasound could be useful for more targeted evaluation. There is otherwise no evidence of aerodigestive tract mass or worrisome cervical adenopathy. Electronically Signed: Wilman Gamino MD  1/31/2025 1:31 PM EST  Workstation ID: [id number]      No orders to display       Procedures    MDM    Patient has pain at the left angle of the mandible.  Pain radiates down the neck into the trapezius.  He feels a nodule in the area.  On exam suspect lymph lymph node.  He may have surrounding inflammation causing the radiating pain. no preceding infectious symptoms..  He has had prior issues similar due to reactive lymphadenopathy.    ED Course as of 01/31/25 1357   Fri Jan 31, 2025   1350 Labs are nonactionable.  CT shows no soft tissue abnormality or fluid collection.  He likely has an emerging lymphadenitis.  I think treatment with short course of oral pain medication and antibiotics would be appropriate.  Will follow up with primary care. [ALBIN]   1354 Pain does not improve with anti-inflammatories.  Given his level of pain I think opioid pain medication would be appropriate and short course.  Checked his Jacek.  It is negative for recent fills.  Last prescription was in September 2024 for hydrocodone. [ALBIN]      ED Course User Index  [ALBIN] Dada Lopez MD        Medications   oxyCODONE (ROXICODONE) immediate release tablet 5 mg (0 mg Oral Hold 1/31/25 1243)   iopamidol (ISOVUE-300) 61 % injection 100 mL (75 mL Intravenous Given 1/31/25 1307)       HEART SCORE   No data recorded           -----  ED Disposition       ED Disposition   Discharge    Condition   Stable    Comment   --             Final diagnoses:   Lymphadenitis      Your Follow-Up Providers       Schedule an appointment as soon as possible for a visit  with Mynor Luna MD.    Specialty: Family Medicine  [address]  [place] [address] [phone]                       Contact  information for after-discharge care    Follow-up information has not been specified.                    Your medication list        START taking these medications        Instructions Last Dose Given Next Dose Due   amoxicillin-clavulanate 875-125 MG per tablet  Commonly known as: AUGMENTIN      Take 1 tablet by mouth 2 (Two) Times a Day for 7 days.       oxyCODONE 5 MG immediate release tablet  Commonly known as: Roxicodone      Take 1 tablet by mouth Every 6 (Six) Hours As Needed for Moderate Pain.              CONTINUE taking these medications        Instructions Last Dose Given Next Dose Due   omeprazole 20 MG capsule  Commonly known as: priLOSEC      Take 1 capsule by mouth Daily.       ondansetron ODT 4 MG disintegrating tablet  Commonly known as: ZOFRAN-ODT      Take 1 tablet by mouth Every 8 (Eight) Hours As Needed for Nausea or Vomiting.                 Where to Get Your Medications        These medications were sent to [facility] [address] [place] [address] [address] [facility] [phone] [facility] [phone]   [address] [facility] [address]      Phone: [phone]   amoxicillin-clavulanate 875-125 MG per tablet  oxyCODONE 5 MG immediate release tablet

## 2025-05-27 ENCOUNTER — OFFICE VISIT (OUTPATIENT)
Dept: FAMILY MEDICINE CLINIC | Facility: CLINIC | Age: 35
End: 2025-05-27
Payer: COMMERCIAL

## 2025-05-27 VITALS
RESPIRATION RATE: 16 BRPM | DIASTOLIC BLOOD PRESSURE: 86 MMHG | TEMPERATURE: 98.1 F | HEIGHT: 73 IN | OXYGEN SATURATION: 97 % | BODY MASS INDEX: 29.82 KG/M2 | SYSTOLIC BLOOD PRESSURE: 126 MMHG | HEART RATE: 68 BPM | WEIGHT: 225 LBS

## 2025-05-27 DIAGNOSIS — K22.70 BARRETT'S ESOPHAGUS WITHOUT DYSPLASIA: ICD-10-CM

## 2025-05-27 DIAGNOSIS — H10.13 ALLERGIC CONJUNCTIVITIS OF BOTH EYES: ICD-10-CM

## 2025-05-27 DIAGNOSIS — J30.1 SEASONAL ALLERGIC RHINITIS DUE TO POLLEN: ICD-10-CM

## 2025-05-27 DIAGNOSIS — N28.89 RIGHT RENAL MASS: ICD-10-CM

## 2025-05-27 DIAGNOSIS — Z00.00 ANNUAL PHYSICAL EXAM: Primary | ICD-10-CM

## 2025-05-27 DIAGNOSIS — K21.9 GASTROESOPHAGEAL REFLUX DISEASE WITHOUT ESOPHAGITIS: ICD-10-CM

## 2025-05-27 DIAGNOSIS — Z13.220 SCREENING CHOLESTEROL LEVEL: ICD-10-CM

## 2025-05-27 PROCEDURE — 2014F MENTAL STATUS ASSESS: CPT | Performed by: FAMILY MEDICINE

## 2025-05-27 PROCEDURE — 99395 PREV VISIT EST AGE 18-39: CPT | Performed by: FAMILY MEDICINE

## 2025-05-27 RX ORDER — AZELASTINE HYDROCHLORIDE 0.5 MG/ML
1 SOLUTION/ DROPS OPHTHALMIC 2 TIMES DAILY
Qty: 6 ML | Refills: 12 | Status: SHIPPED | OUTPATIENT
Start: 2025-05-27

## 2025-05-27 RX ORDER — MONTELUKAST SODIUM 10 MG/1
10 TABLET ORAL NIGHTLY
Qty: 30 TABLET | Refills: 11 | Status: SHIPPED | OUTPATIENT
Start: 2025-05-27

## 2025-05-27 RX ORDER — FEXOFENADINE HCL 180 MG/1
180 TABLET ORAL DAILY
Qty: 30 TABLET | Refills: 11 | Status: SHIPPED | OUTPATIENT
Start: 2025-05-27

## 2025-05-27 RX ORDER — OMEPRAZOLE 20 MG/1
20 CAPSULE, DELAYED RELEASE ORAL DAILY
Qty: 90 CAPSULE | Refills: 3 | Status: SHIPPED | OUTPATIENT
Start: 2025-05-27

## 2025-05-27 NOTE — PROGRESS NOTES
"Chief Complaint   Patient presents with    Annual Exam    Requesting allergy meds     Claritin and Zyrtec didn't help.        Subjective      Joel Angelo is a 35 y.o. who presents for Annual Physical.    Sleep.  6 to 8 hours per night.    Diet.  Patient eats whole foods with minimal junk food.  He has lactose intolerance.  He has been using a \"carnivore diet\" to aid with weight loss.  Patient weight is down approximately 17 pounds in the last year.    Physical activity.  Patient exercises combination of aerobic and resistance training 3 days/week.    Medical history is significant for Baires's esophagus diagnosed in 2020 with biopsies negative for dysplasia.  He is due for surveillance EGD this year and an order will be placed at this visit.      Patient is also been diagnosed with rheumatoid arthritis and psoriatic arthritis.  He did not tolerate methotrexate.  Weight loss has improved some of his joint pains.    Since patient's physical 1 year ago he underwent urgent appendectomy.    Patient has been having some back pain and had an MRI through his chiropractor.  The CT that diagnosed his appendicitis and the MRI of his back have both shown a right renal lesion consistent with a cyst.  He denies hematuria.    The following portions of the patient's history were reviewed and updated as appropriate: allergies, current medications, past family history, past medical history, past social history, past surgical history, and problem list.    Review of Systems   HENT:  Positive for congestion, postnasal drip, rhinorrhea and sneezing.    Eyes:  Positive for redness and itching.   Musculoskeletal:  Positive for arthralgias.   Allergic/Immunologic: Positive for environmental allergies.   All other systems reviewed and are negative.      Objective   Vital Signs:  /86   Pulse 68   Temp 98.1 °F (36.7 °C)   Resp 16   Ht 185.4 cm (73\")   Wt 102 kg (225 lb)   SpO2 97%   BMI 29.69 kg/m²     BMI is >= 30 and <35. (Class " 1 Obesity). The following options were offered after discussion;: exercise counseling/recommendations and nutrition counseling/recommendations        Physical Exam  Constitutional:       General: He is not in acute distress.     Appearance: Normal appearance. He is not ill-appearing.   HENT:      Head: Normocephalic and atraumatic.      Right Ear: Tympanic membrane and ear canal normal.      Left Ear: Tympanic membrane and ear canal normal.      Nose: Nose normal.      Mouth/Throat:      Mouth: Mucous membranes are moist.      Pharynx: Oropharynx is clear. No posterior oropharyngeal erythema.   Eyes:      Extraocular Movements: Extraocular movements intact.      Conjunctiva/sclera: Conjunctivae normal.      Pupils: Pupils are equal, round, and reactive to light.   Cardiovascular:      Rate and Rhythm: Normal rate and regular rhythm.      Pulses: Normal pulses.      Heart sounds: Normal heart sounds.   Pulmonary:      Effort: Pulmonary effort is normal. No respiratory distress.      Breath sounds: Normal breath sounds.   Abdominal:      General: Abdomen is flat. Bowel sounds are normal.      Palpations: Abdomen is soft.      Tenderness: There is no abdominal tenderness.   Musculoskeletal:         General: Normal range of motion.      Cervical back: Normal range of motion and neck supple.   Lymphadenopathy:      Cervical: No cervical adenopathy.   Skin:     General: Skin is warm and dry.      Capillary Refill: Capillary refill takes less than 2 seconds.   Neurological:      General: No focal deficit present.      Mental Status: He is alert and oriented to person, place, and time. Mental status is at baseline.      Cranial Nerves: No cranial nerve deficit.      Sensory: No sensory deficit.      Motor: No weakness.   Psychiatric:         Mood and Affect: Mood normal.         Behavior: Behavior normal.         Thought Content: Thought content normal.         Judgment: Judgment normal.          Result Review                      Assessment and Plan  Diagnoses and all orders for this visit:    1. Annual physical exam (Primary)    2. Right renal mass  -     US Renal Bilateral; Future    3. Screening cholesterol level  -     Lipid Panel    4. Gastroesophageal reflux disease without esophagitis  -     omeprazole (priLOSEC) 20 MG capsule; Take 1 capsule by mouth Daily.  Dispense: 90 capsule; Refill: 3    5. Baires's esophagus without dysplasia  -     Ambulatory referral for Screening EGD    6. Seasonal allergic rhinitis due to pollen  -     omeprazole (priLOSEC) 20 MG capsule; Take 1 capsule by mouth Daily.  Dispense: 90 capsule; Refill: 3  -     fexofenadine (Allegra Allergy) 180 MG tablet; Take 1 tablet by mouth Daily.  Dispense: 30 tablet; Refill: 11  -     montelukast (Singulair) 10 MG tablet; Take 1 tablet by mouth Every Night.  Dispense: 30 tablet; Refill: 11    7. Allergic conjunctivitis of both eyes  -     azelastine (OPTIVAR) 0.05 % ophthalmic solution; Administer 1 drop to both eyes 2 (Two) Times a Day.  Dispense: 6 mL; Refill: 12    Plan  1.  Patient should continue his active lifestyle.  While his BMI is above goal patient is a well muscled individual.  We reviewed physical activity and dietary guidelines  2.  Screening cholesterol today  3.  Referral for EGD for surveillance of Baires's esophagus without dysplasia  4.  Omeprazole was refilled for 1 year and patient will need EGD in 2025  5.  Allergy symptoms are poorly controlled.  Maximize medical therapy and if no improvement refer to allergist  6.  Renal lesion is newly identified and an ultrasound will be ordered        Follow Up  Return in about 1 year (around 5/27/2026) for Annual.  Patient was given instructions and counseling regarding his condition or for health maintenance advice. Please see specific information pulled into the AVS if appropriate.

## 2025-05-28 LAB
CHOLEST SERPL-MCNC: 226 MG/DL (ref 0–200)
HDLC SERPL-MCNC: 51 MG/DL (ref 40–60)
LDLC SERPL CALC-MCNC: 158 MG/DL (ref 0–100)
TRIGL SERPL-MCNC: 94 MG/DL (ref 0–150)
VLDLC SERPL CALC-MCNC: 17 MG/DL (ref 5–40)

## 2025-06-24 ENCOUNTER — HOSPITAL ENCOUNTER (OUTPATIENT)
Dept: ULTRASOUND IMAGING | Facility: HOSPITAL | Age: 35
Discharge: HOME | End: 2025-06-24
Admitting: FAMILY MEDICINE
Payer: COMMERCIAL

## 2025-06-24 DIAGNOSIS — N28.89 RIGHT RENAL MASS: ICD-10-CM

## 2025-06-24 PROCEDURE — 76775 US EXAM ABDO BACK WALL LIM: CPT

## (undated) DEVICE — MARYLAND JAW LAPAROSCOPIC SEALER/DIVIDER COATED: Brand: LIGASURE

## (undated) DEVICE — APPL CHLORAPREP TINTED 26ML TEAL

## (undated) DEVICE — GLV SURG PREMIERPRO MIC LTX PF SZ8 BRN

## (undated) DEVICE — INTENDED FOR TISSUE SEPARATION, AND OTHER PROCEDURES THAT REQUIRE A SHARP SURGICAL BLADE TO PUNCTURE OR CUT.: Brand: BARD-PARKER ® STAINLESS STEEL BLADES

## (undated) DEVICE — ECHELON 3000 45 STANDARD: Brand: ECHELON

## (undated) DEVICE — ENDOPOUCH RETRIEVER SPECIMEN RETRIEVAL BAGS: Brand: ENDOPOUCH RETRIEVER

## (undated) DEVICE — SUT MNCRYL PLS ANTIB UD 3/0 PS2 27IN

## (undated) DEVICE — ENDOPATH XCEL BLADELESS TROCARS WITH STABILITY SLEEVES: Brand: ENDOPATH XCEL

## (undated) DEVICE — LAPAROSCOPIC SMOKE FILTRATION SYSTEM: Brand: PALL LAPAROSHIELD® PLUS LAPAROSCOPIC SMOKE FILTRATION SYSTEM

## (undated) DEVICE — ENDOPATH XCEL BLUNT TIP TROCARS WITH SMOOTH SLEEVES: Brand: ENDOPATH XCEL

## (undated) DEVICE — GLV SURG PREMIERPRO MIC LTX PF SZ7.5 BRN

## (undated) DEVICE — PATIENT RETURN ELECTRODE, SINGLE-USE, CONTACT QUALITY MONITORING, ADULT, WITH 9FT CORD, FOR PATIENTS WEIGING OVER 33LBS. (15KG): Brand: MEGADYNE

## (undated) DEVICE — LAPAROVUE VISIBILITY SYSTEM LAPAROSCOPIC SOLUTIONS: Brand: LAPAROVUE

## (undated) DEVICE — BLANKT WARM UPPR/BDY ARM/OUT 57X196CM

## (undated) DEVICE — ENDOPATH XCEL UNIVERSAL TROCAR STABLILITY SLEEVES: Brand: ENDOPATH XCEL

## (undated) DEVICE — PK LAP LASR CHOLE 10

## (undated) DEVICE — [HIGH FLOW INSUFFLATOR,  DO NOT USE IF PACKAGE IS DAMAGED,  KEEP DRY,  KEEP AWAY FROM SUNLIGHT,  PROTECT FROM HEAT AND RADIOACTIVE SOURCES.]: Brand: PNEUMOSURE

## (undated) DEVICE — SUT VIC 0 UR6 27IN VCP603H

## (undated) DEVICE — MINI ENDOCUT SCISSOR TIP, DISPOSABLE: Brand: RENEW

## (undated) DEVICE — ENDOCUT SCISSOR TIP, DISPOSABLE: Brand: RENEW